# Patient Record
Sex: MALE | Race: WHITE | Employment: FULL TIME | URBAN - METROPOLITAN AREA
[De-identification: names, ages, dates, MRNs, and addresses within clinical notes are randomized per-mention and may not be internally consistent; named-entity substitution may affect disease eponyms.]

---

## 2017-01-03 ENCOUNTER — TELEPHONE (OUTPATIENT)
Dept: FAMILY MEDICINE CLINIC | Age: 22
End: 2017-01-03

## 2017-07-12 ENCOUNTER — OFFICE VISIT (OUTPATIENT)
Dept: SURGERY | Age: 22
End: 2017-07-12

## 2017-07-12 VITALS
HEIGHT: 72 IN | DIASTOLIC BLOOD PRESSURE: 98 MMHG | SYSTOLIC BLOOD PRESSURE: 145 MMHG | WEIGHT: 302 LBS | BODY MASS INDEX: 40.9 KG/M2 | HEART RATE: 83 BPM

## 2017-07-12 DIAGNOSIS — Z72.0 TOBACCO ABUSE: ICD-10-CM

## 2017-07-12 DIAGNOSIS — K60.2 ANAL FISSURE: Primary | ICD-10-CM

## 2017-07-12 PROCEDURE — 99213 OFFICE O/P EST LOW 20 MIN: CPT | Performed by: SURGERY

## 2017-07-12 ASSESSMENT — ENCOUNTER SYMPTOMS
RECTAL PAIN: 1
RESPIRATORY NEGATIVE: 1
ANAL BLEEDING: 0
CONSTIPATION: 1
BLOOD IN STOOL: 0

## 2017-10-31 ENCOUNTER — OFFICE VISIT (OUTPATIENT)
Dept: FAMILY MEDICINE CLINIC | Age: 22
End: 2017-10-31

## 2017-10-31 VITALS
BODY MASS INDEX: 40.66 KG/M2 | HEIGHT: 72 IN | TEMPERATURE: 99.1 F | HEART RATE: 80 BPM | WEIGHT: 300.2 LBS | SYSTOLIC BLOOD PRESSURE: 140 MMHG | DIASTOLIC BLOOD PRESSURE: 92 MMHG

## 2017-10-31 DIAGNOSIS — R51.9 ACUTE NONINTRACTABLE HEADACHE, UNSPECIFIED HEADACHE TYPE: Primary | ICD-10-CM

## 2017-10-31 DIAGNOSIS — H53.9 VISION CHANGES: ICD-10-CM

## 2017-10-31 PROCEDURE — G8417 CALC BMI ABV UP PARAM F/U: HCPCS | Performed by: FAMILY MEDICINE

## 2017-10-31 PROCEDURE — G8484 FLU IMMUNIZE NO ADMIN: HCPCS | Performed by: FAMILY MEDICINE

## 2017-10-31 PROCEDURE — 4004F PT TOBACCO SCREEN RCVD TLK: CPT | Performed by: FAMILY MEDICINE

## 2017-10-31 PROCEDURE — 99214 OFFICE O/P EST MOD 30 MIN: CPT | Performed by: FAMILY MEDICINE

## 2017-10-31 PROCEDURE — G8427 DOCREV CUR MEDS BY ELIG CLIN: HCPCS | Performed by: FAMILY MEDICINE

## 2017-10-31 RX ORDER — METHYLPREDNISOLONE 4 MG/1
TABLET ORAL
Qty: 1 KIT | Refills: 0 | Status: SHIPPED | OUTPATIENT
Start: 2017-10-31 | End: 2017-11-06

## 2017-10-31 RX ORDER — BUTALBITAL, ACETAMINOPHEN AND CAFFEINE 300; 40; 50 MG/1; MG/1; MG/1
1 CAPSULE ORAL EVERY 4 HOURS PRN
Qty: 20 CAPSULE | Refills: 0 | Status: SHIPPED | OUTPATIENT
Start: 2017-10-31 | End: 2018-12-10 | Stop reason: ALTCHOICE

## 2017-10-31 ASSESSMENT — ENCOUNTER SYMPTOMS
CHEST TIGHTNESS: 0
EYE REDNESS: 0
EYE DISCHARGE: 0
VOMITING: 0
SHORTNESS OF BREATH: 0
CONSTIPATION: 0
COUGH: 0
ABDOMINAL PAIN: 0
SORE THROAT: 0
PHOTOPHOBIA: 1
SINUS PAIN: 0
BLOOD IN STOOL: 0
EYE PAIN: 0
WHEEZING: 0
SINUS PRESSURE: 0
DIARRHEA: 0
RHINORRHEA: 0

## 2017-10-31 NOTE — PATIENT INSTRUCTIONS
Cigarette Smoking and Its Health Risks   GENERAL INFORMATION:   Smoking and your health: Cigarette smoking is the most preventable cause of illness and death in the [place]. A large number of Americans smoke cigarettes, and each year more than one million children and adults start smoking cigarettes. Many people die every year from illnesses caused by smoking. People who smoke die earlier than those who do not smoke. The risk of disease increases if you smoke a lot, inhale deeply, or have smoked many years. Why are cigarettes bad for you? Cigarettes are filled with poison that goes into the lungs when you inhale. Coughing, dizziness, and burning of the eyes, nose, and throat are early signs that smoking is harming you. Smoking increases your health risks if you have diabetes, high blood pressure, or high blood cholesterol. The long-term problems of smoking cigarettes are the following:   Cancer: Smoking increases your chances of getting cancer. Cigarette smoking may play a role in developing many kinds of cancer. Lung cancer is the most common kind of cancer caused by smoking. A smoker is at greater risk of getting cancer of the lips, mouth, throat, or voice box. Smokers also have a higher risk of getting esophagus, stomach, kidney, pancreas, cervix, bladder, and skin cancer. Heart and blood vessel disease: If you already have heart or blood vessel problems and smoke, you are at even greater risk of having continued or worse health problems. The nicotine in the tobacco causes an increase in your heart rate and blood pressure. The arteries (blood vessels) in your arms and legs tighten and narrow because of the nicotine in cigarette smoke. Cigarette smoke increases blood clotting, and may damage the lining of your heart's arteries and other blood vessels. Carbon monoxide is a harmful gas that gets into the blood and decreases oxygen going to the heart and the body. Cigarette smoke contains this gas. Women and smoking: You may have a higher risk of having a heart attack or stroke if you smoke and use birth control pills. This risk is more serious if you are 35 years or older. The risk of losing your unborn baby or having a stillborn baby is higher if you are pregnant and smoke. Babies born to smoking mothers often weigh less, and are at a higher risk of sudden infant death syndrome (SIDS). You may have a harder time getting pregnant if you are a smoker. Women who smoke may have a higher risk of osteoporosis (also known as \"brittle bones\"). Women who smoke also have a higher risk of incontinence, which is when you are unable to control when you urinate. Are there risks with smoking cigars or pipes? The risks are the same for people who smoke cigars or pipes as they are for cigarette smokers. There is a risk of getting cancer of the mouth, lip, larynx (voice box), or esophagus if you smoke a cigar or pipe. What are the risks of using snuff or chewing tobacco (\"smokeless tobacco\")? People who use snuff or chewing tobacco have an increased risk of getting mouth or throat cancer. The risk of heart disease, stroke, blood vessel disease and stomach problems is the same as it is for cigarette smokers. What is \"passive smoking\"? Tobacco smoke is dangerous to others. The effect that smoking has on nonsmokers is called \"passive smoking\". Nonsmokers who breathe tobacco smoke have the same health risks as smokers. Children who are around tobacco smoke may have more colds, ear infections, or other breathing problems. Why should I quit smoking? The benefits from quitting smoking happen right away. Your sense of taste and smell will improve. Your body, clothes, car, and home will not smell of tobacco smoke. Your chance of getting cancer will be reduced as compared to a person who does not quit. As a former smoker, you will live longer than people who continue to smoke.  Women who quit smoking before getting pregnant have before following any medical regimen to see if it is safe and effective for you.

## 2017-10-31 NOTE — PROGRESS NOTES
Family History   Problem Relation Age of Onset    Asthma Sister     Cancer Paternal Grandmother      colon     Social History   Substance Use Topics    Smoking status: Never Smoker    Smokeless tobacco: Current User     Types: Chew      Comment: a third of a can a day     Alcohol use No     Vitals:    10/31/17 1119 10/31/17 1156   BP: (!) 142/94 (!) 140/92   Pulse: 80    Temp: 99.1 °F (37.3 °C)    TempSrc: Oral    Weight: (!) 300 lb 3.2 oz (136.2 kg)    Height: 6' (1.829 m)      Body mass index is 40.71 kg/m². Wt Readings from Last 3 Encounters:   10/31/17 (!) 300 lb 3.2 oz (136.2 kg)   07/12/17 (!) 302 lb (137 kg)   03/03/17 280 lb (127 kg)     BP Readings from Last 3 Encounters:   10/31/17 (!) 140/92   07/12/17 (!) 145/98   03/03/17 140/90        Review of Systems   Constitutional: Negative for chills, fatigue, fever and unexpected weight change. HENT: Negative for congestion, ear discharge, ear pain, hearing loss, postnasal drip, rhinorrhea, sinus pain, sinus pressure, sore throat and tinnitus. Eyes: Positive for photophobia and visual disturbance. Negative for pain, discharge and redness. Respiratory: Negative for cough, chest tightness, shortness of breath and wheezing. Cardiovascular: Negative for chest pain, palpitations and leg swelling. Gastrointestinal: Negative for abdominal pain, blood in stool, constipation, diarrhea and vomiting. Genitourinary: Negative for difficulty urinating, dysuria and hematuria. Skin: Negative for rash. Neurological: Positive for headaches. Negative for dizziness, seizures, syncope, weakness, light-headedness and numbness. Psychiatric/Behavioral: Negative for dysphoric mood and sleep disturbance. The patient is not nervous/anxious. Objective:   Physical Exam   Constitutional: He is oriented to person, place, and time. He appears well-developed and well-nourished. No distress. HENT:   Head: Normocephalic and atraumatic.    Right Ear:

## 2018-12-10 ENCOUNTER — OFFICE VISIT (OUTPATIENT)
Dept: FAMILY MEDICINE CLINIC | Age: 23
End: 2018-12-10
Payer: COMMERCIAL

## 2018-12-10 VITALS
WEIGHT: 299 LBS | SYSTOLIC BLOOD PRESSURE: 128 MMHG | DIASTOLIC BLOOD PRESSURE: 84 MMHG | BODY MASS INDEX: 40.5 KG/M2 | HEIGHT: 72 IN | TEMPERATURE: 98.6 F

## 2018-12-10 DIAGNOSIS — R59.9 ENLARGED LYMPH NODE: Primary | ICD-10-CM

## 2018-12-10 DIAGNOSIS — R59.9 ENLARGED LYMPH NODE: ICD-10-CM

## 2018-12-10 LAB
BASOPHILS ABSOLUTE: 0.1 K/UL (ref 0–0.2)
BASOPHILS RELATIVE PERCENT: 0.7 %
EOSINOPHILS ABSOLUTE: 0.3 K/UL (ref 0–0.6)
EOSINOPHILS RELATIVE PERCENT: 2.8 %
HCT VFR BLD CALC: 49.7 % (ref 40.5–52.5)
HEMOGLOBIN: 16.9 G/DL (ref 13.5–17.5)
LYMPHOCYTES ABSOLUTE: 2.5 K/UL (ref 1–5.1)
LYMPHOCYTES RELATIVE PERCENT: 26.2 %
MCH RBC QN AUTO: 27.6 PG (ref 26–34)
MCHC RBC AUTO-ENTMCNC: 33.9 G/DL (ref 31–36)
MCV RBC AUTO: 81.4 FL (ref 80–100)
MONOCYTES ABSOLUTE: 1.1 K/UL (ref 0–1.3)
MONOCYTES RELATIVE PERCENT: 11.2 %
NEUTROPHILS ABSOLUTE: 5.7 K/UL (ref 1.7–7.7)
NEUTROPHILS RELATIVE PERCENT: 59.1 %
PDW BLD-RTO: 13 % (ref 12.4–15.4)
PLATELET # BLD: 338 K/UL (ref 135–450)
PMV BLD AUTO: 9.5 FL (ref 5–10.5)
RBC # BLD: 6.1 M/UL (ref 4.2–5.9)
WBC # BLD: 9.7 K/UL (ref 4–11)

## 2018-12-10 PROCEDURE — G8484 FLU IMMUNIZE NO ADMIN: HCPCS | Performed by: FAMILY MEDICINE

## 2018-12-10 PROCEDURE — G8427 DOCREV CUR MEDS BY ELIG CLIN: HCPCS | Performed by: FAMILY MEDICINE

## 2018-12-10 PROCEDURE — 1036F TOBACCO NON-USER: CPT | Performed by: FAMILY MEDICINE

## 2018-12-10 PROCEDURE — G8417 CALC BMI ABV UP PARAM F/U: HCPCS | Performed by: FAMILY MEDICINE

## 2018-12-10 PROCEDURE — 99213 OFFICE O/P EST LOW 20 MIN: CPT | Performed by: FAMILY MEDICINE

## 2018-12-10 ASSESSMENT — PATIENT HEALTH QUESTIONNAIRE - PHQ9
1. LITTLE INTEREST OR PLEASURE IN DOING THINGS: 0
SUM OF ALL RESPONSES TO PHQ QUESTIONS 1-9: 0
2. FEELING DOWN, DEPRESSED OR HOPELESS: 0
SUM OF ALL RESPONSES TO PHQ QUESTIONS 1-9: 0
SUM OF ALL RESPONSES TO PHQ9 QUESTIONS 1 & 2: 0

## 2019-01-21 ENCOUNTER — HOSPITAL ENCOUNTER (OUTPATIENT)
Dept: GENERAL RADIOLOGY | Age: 24
Discharge: HOME | End: 2019-01-21
Payer: COMMERCIAL

## 2019-01-21 ENCOUNTER — HOSPITAL ENCOUNTER (OUTPATIENT)
Age: 24
Discharge: HOME | End: 2019-01-21
Payer: COMMERCIAL

## 2019-01-21 ENCOUNTER — OFFICE VISIT (OUTPATIENT)
Dept: FAMILY MEDICINE CLINIC | Age: 24
End: 2019-01-21
Payer: COMMERCIAL

## 2019-01-21 VITALS
TEMPERATURE: 98.4 F | SYSTOLIC BLOOD PRESSURE: 134 MMHG | DIASTOLIC BLOOD PRESSURE: 88 MMHG | WEIGHT: 305.4 LBS | BODY MASS INDEX: 41.37 KG/M2 | HEIGHT: 72 IN

## 2019-01-21 DIAGNOSIS — M54.6 CHRONIC BILATERAL THORACIC BACK PAIN: ICD-10-CM

## 2019-01-21 DIAGNOSIS — R79.89 ABNORMAL CBC: ICD-10-CM

## 2019-01-21 DIAGNOSIS — G89.29 CHRONIC BILATERAL THORACIC BACK PAIN: Primary | ICD-10-CM

## 2019-01-21 DIAGNOSIS — G89.29 CHRONIC BILATERAL THORACIC BACK PAIN: ICD-10-CM

## 2019-01-21 DIAGNOSIS — M54.6 CHRONIC BILATERAL THORACIC BACK PAIN: Primary | ICD-10-CM

## 2019-01-21 LAB
BASOPHILS ABSOLUTE: 0.1 K/UL (ref 0–0.2)
BASOPHILS RELATIVE PERCENT: 0.7 %
EOSINOPHILS ABSOLUTE: 0.2 K/UL (ref 0–0.6)
EOSINOPHILS RELATIVE PERCENT: 2.3 %
HCT VFR BLD CALC: 48.9 % (ref 40.5–52.5)
HEMOGLOBIN: 16.6 G/DL (ref 13.5–17.5)
LYMPHOCYTES ABSOLUTE: 2.2 K/UL (ref 1–5.1)
LYMPHOCYTES RELATIVE PERCENT: 23.8 %
MCH RBC QN AUTO: 28 PG (ref 26–34)
MCHC RBC AUTO-ENTMCNC: 34 G/DL (ref 31–36)
MCV RBC AUTO: 82.3 FL (ref 80–100)
MONOCYTES ABSOLUTE: 1.1 K/UL (ref 0–1.3)
MONOCYTES RELATIVE PERCENT: 11.6 %
NEUTROPHILS ABSOLUTE: 5.7 K/UL (ref 1.7–7.7)
NEUTROPHILS RELATIVE PERCENT: 61.6 %
PDW BLD-RTO: 13.1 % (ref 12.4–15.4)
PLATELET # BLD: 333 K/UL (ref 135–450)
PMV BLD AUTO: 10.3 FL (ref 5–10.5)
RBC # BLD: 5.93 M/UL (ref 4.2–5.9)
WBC # BLD: 9.3 K/UL (ref 4–11)

## 2019-01-21 PROCEDURE — G8427 DOCREV CUR MEDS BY ELIG CLIN: HCPCS | Performed by: FAMILY MEDICINE

## 2019-01-21 PROCEDURE — 99213 OFFICE O/P EST LOW 20 MIN: CPT | Performed by: FAMILY MEDICINE

## 2019-01-21 PROCEDURE — G8417 CALC BMI ABV UP PARAM F/U: HCPCS | Performed by: FAMILY MEDICINE

## 2019-01-21 PROCEDURE — 72072 X-RAY EXAM THORAC SPINE 3VWS: CPT

## 2019-01-21 PROCEDURE — G8484 FLU IMMUNIZE NO ADMIN: HCPCS | Performed by: FAMILY MEDICINE

## 2019-01-21 PROCEDURE — 1036F TOBACCO NON-USER: CPT | Performed by: FAMILY MEDICINE

## 2019-01-21 PROCEDURE — 71046 X-RAY EXAM CHEST 2 VIEWS: CPT

## 2019-11-26 ENCOUNTER — HOSPITAL ENCOUNTER (OUTPATIENT)
Age: 24
Discharge: HOME | End: 2019-11-26
Payer: COMMERCIAL

## 2019-11-26 ENCOUNTER — OFFICE VISIT (OUTPATIENT)
Dept: FAMILY MEDICINE CLINIC | Age: 24
End: 2019-11-26
Payer: COMMERCIAL

## 2019-11-26 ENCOUNTER — HOSPITAL ENCOUNTER (OUTPATIENT)
Dept: GENERAL RADIOLOGY | Age: 24
Discharge: HOME | End: 2019-11-26
Payer: COMMERCIAL

## 2019-11-26 VITALS
WEIGHT: 301 LBS | OXYGEN SATURATION: 98 % | TEMPERATURE: 99.5 F | SYSTOLIC BLOOD PRESSURE: 138 MMHG | HEIGHT: 72 IN | DIASTOLIC BLOOD PRESSURE: 86 MMHG | HEART RATE: 76 BPM | BODY MASS INDEX: 40.77 KG/M2

## 2019-11-26 DIAGNOSIS — R07.9 CHEST PAIN, UNSPECIFIED TYPE: ICD-10-CM

## 2019-11-26 DIAGNOSIS — R42 DIZZY: ICD-10-CM

## 2019-11-26 DIAGNOSIS — R07.9 CHEST PAIN, UNSPECIFIED TYPE: Primary | ICD-10-CM

## 2019-11-26 DIAGNOSIS — R06.09 DOE (DYSPNEA ON EXERTION): ICD-10-CM

## 2019-11-26 PROCEDURE — G8484 FLU IMMUNIZE NO ADMIN: HCPCS | Performed by: FAMILY MEDICINE

## 2019-11-26 PROCEDURE — 99214 OFFICE O/P EST MOD 30 MIN: CPT | Performed by: FAMILY MEDICINE

## 2019-11-26 PROCEDURE — 71046 X-RAY EXAM CHEST 2 VIEWS: CPT

## 2019-11-26 PROCEDURE — G8427 DOCREV CUR MEDS BY ELIG CLIN: HCPCS | Performed by: FAMILY MEDICINE

## 2019-11-26 PROCEDURE — 93000 ELECTROCARDIOGRAM COMPLETE: CPT | Performed by: FAMILY MEDICINE

## 2019-11-26 PROCEDURE — 1036F TOBACCO NON-USER: CPT | Performed by: FAMILY MEDICINE

## 2019-11-26 PROCEDURE — G8417 CALC BMI ABV UP PARAM F/U: HCPCS | Performed by: FAMILY MEDICINE

## 2019-11-27 ENCOUNTER — TELEPHONE (OUTPATIENT)
Dept: FAMILY MEDICINE CLINIC | Age: 24
End: 2019-11-27

## 2019-11-27 LAB
A/G RATIO: 1.4 (ref 1.1–2.2)
ALBUMIN SERPL-MCNC: 4.7 G/DL (ref 3.4–5)
ALP BLD-CCNC: 89 U/L (ref 40–129)
ALT SERPL-CCNC: 30 U/L (ref 10–40)
ANION GAP SERPL CALCULATED.3IONS-SCNC: 16 MMOL/L (ref 3–16)
AST SERPL-CCNC: 26 U/L (ref 15–37)
BASOPHILS ABSOLUTE: 0.1 K/UL (ref 0–0.2)
BASOPHILS RELATIVE PERCENT: 0.8 %
BILIRUB SERPL-MCNC: 0.3 MG/DL (ref 0–1)
BUN BLDV-MCNC: 14 MG/DL (ref 7–20)
CALCIUM SERPL-MCNC: 9.5 MG/DL (ref 8.3–10.6)
CHLORIDE BLD-SCNC: 97 MMOL/L (ref 99–110)
CO2: 23 MMOL/L (ref 21–32)
CREAT SERPL-MCNC: 0.8 MG/DL (ref 0.9–1.3)
EOSINOPHILS ABSOLUTE: 0.3 K/UL (ref 0–0.6)
EOSINOPHILS RELATIVE PERCENT: 3.3 %
GFR AFRICAN AMERICAN: >60
GFR NON-AFRICAN AMERICAN: >60
GLOBULIN: 3.3 G/DL
GLUCOSE BLD-MCNC: 88 MG/DL (ref 70–99)
HCT VFR BLD CALC: 47.6 % (ref 40.5–52.5)
HEMOGLOBIN: 16.1 G/DL (ref 13.5–17.5)
LYMPHOCYTES ABSOLUTE: 2.3 K/UL (ref 1–5.1)
LYMPHOCYTES RELATIVE PERCENT: 27.1 %
MCH RBC QN AUTO: 28.5 PG (ref 26–34)
MCHC RBC AUTO-ENTMCNC: 33.8 G/DL (ref 31–36)
MCV RBC AUTO: 84.3 FL (ref 80–100)
MONOCYTES ABSOLUTE: 1.2 K/UL (ref 0–1.3)
MONOCYTES RELATIVE PERCENT: 14 %
NEUTROPHILS ABSOLUTE: 4.7 K/UL (ref 1.7–7.7)
NEUTROPHILS RELATIVE PERCENT: 54.8 %
PDW BLD-RTO: 13.2 % (ref 12.4–15.4)
PLATELET # BLD: 290 K/UL (ref 135–450)
PMV BLD AUTO: 10.3 FL (ref 5–10.5)
POTASSIUM SERPL-SCNC: 4.4 MMOL/L (ref 3.5–5.1)
RBC # BLD: 5.65 M/UL (ref 4.2–5.9)
SODIUM BLD-SCNC: 136 MMOL/L (ref 136–145)
TOTAL PROTEIN: 8 G/DL (ref 6.4–8.2)
TSH SERPL DL<=0.05 MIU/L-ACNC: 1.35 UIU/ML (ref 0.27–4.2)
WBC # BLD: 8.7 K/UL (ref 4–11)

## 2019-12-02 ENCOUNTER — HOSPITAL ENCOUNTER (EMERGENCY)
Age: 24
Discharge: HOME | End: 2019-12-02
Attending: EMERGENCY MEDICINE
Payer: COMMERCIAL

## 2019-12-02 ENCOUNTER — APPOINTMENT (OUTPATIENT)
Dept: GENERAL RADIOLOGY | Age: 24
End: 2019-12-02
Payer: COMMERCIAL

## 2019-12-02 ENCOUNTER — TELEPHONE (OUTPATIENT)
Dept: FAMILY MEDICINE CLINIC | Age: 24
End: 2019-12-02

## 2019-12-02 VITALS
OXYGEN SATURATION: 100 % | BODY MASS INDEX: 40.44 KG/M2 | WEIGHT: 305.12 LBS | DIASTOLIC BLOOD PRESSURE: 82 MMHG | HEIGHT: 73 IN | TEMPERATURE: 98.3 F | RESPIRATION RATE: 10 BRPM | SYSTOLIC BLOOD PRESSURE: 165 MMHG | HEART RATE: 91 BPM

## 2019-12-02 DIAGNOSIS — R07.9 CHEST PAIN, UNSPECIFIED TYPE: Primary | ICD-10-CM

## 2019-12-02 DIAGNOSIS — I10 HYPERTENSION, UNSPECIFIED TYPE: ICD-10-CM

## 2019-12-02 DIAGNOSIS — R42 DIZZINESS: ICD-10-CM

## 2019-12-02 LAB
A/G RATIO: 1.3 (ref 1.1–2.2)
ALBUMIN SERPL-MCNC: 4.7 G/DL (ref 3.4–5)
ALP BLD-CCNC: 94 U/L (ref 40–129)
ALT SERPL-CCNC: 36 U/L (ref 10–40)
ANION GAP SERPL CALCULATED.3IONS-SCNC: 12 MMOL/L (ref 3–16)
AST SERPL-CCNC: 22 U/L (ref 15–37)
BASOPHILS ABSOLUTE: 0.5 K/UL (ref 0–0.2)
BASOPHILS RELATIVE PERCENT: 4.8 %
BILIRUB SERPL-MCNC: 0.3 MG/DL (ref 0–1)
BUN BLDV-MCNC: 12 MG/DL (ref 7–20)
CALCIUM SERPL-MCNC: 9.8 MG/DL (ref 8.3–10.6)
CHLORIDE BLD-SCNC: 100 MMOL/L (ref 99–110)
CO2: 25 MMOL/L (ref 21–32)
CREAT SERPL-MCNC: 0.8 MG/DL (ref 0.9–1.3)
D DIMER: 0.36 UG/ML FEU
EKG ATRIAL RATE: 77 BPM
EKG DIAGNOSIS: NORMAL
EKG P AXIS: 25 DEGREES
EKG P-R INTERVAL: 170 MS
EKG Q-T INTERVAL: 350 MS
EKG QRS DURATION: 86 MS
EKG QTC CALCULATION (BAZETT): 396 MS
EKG R AXIS: 46 DEGREES
EKG T AXIS: 5 DEGREES
EKG VENTRICULAR RATE: 77 BPM
EOSINOPHILS ABSOLUTE: 0.2 K/UL (ref 0–0.6)
EOSINOPHILS RELATIVE PERCENT: 2.3 %
GFR AFRICAN AMERICAN: >60
GFR NON-AFRICAN AMERICAN: >60
GLOBULIN: 3.5 G/DL
GLUCOSE BLD-MCNC: 92 MG/DL (ref 70–99)
HCT VFR BLD CALC: 49.3 % (ref 40.5–52.5)
HEMOGLOBIN: 16.8 G/DL (ref 13.5–17.5)
LYMPHOCYTES ABSOLUTE: 1.9 K/UL (ref 1–5.1)
LYMPHOCYTES RELATIVE PERCENT: 19.3 %
MCH RBC QN AUTO: 28.2 PG (ref 26–34)
MCHC RBC AUTO-ENTMCNC: 34.1 G/DL (ref 31–36)
MCV RBC AUTO: 82.8 FL (ref 80–100)
MONOCYTES ABSOLUTE: 0.9 K/UL (ref 0–1.3)
MONOCYTES RELATIVE PERCENT: 8.8 %
NEUTROPHILS ABSOLUTE: 6.3 K/UL (ref 1.7–7.7)
NEUTROPHILS RELATIVE PERCENT: 64.8 %
PDW BLD-RTO: 12 % (ref 12.4–15.4)
PLATELET # BLD: 315 K/UL (ref 135–450)
PMV BLD AUTO: 9.2 FL (ref 5–10.5)
POTASSIUM REFLEX MAGNESIUM: 4.3 MMOL/L (ref 3.5–5.1)
PRO-BNP: 24 PG/ML (ref 0–124)
RBC # BLD: 5.96 M/UL (ref 4.2–5.9)
SODIUM BLD-SCNC: 137 MMOL/L (ref 136–145)
TOTAL PROTEIN: 8.2 G/DL (ref 6.4–8.2)
TROPONIN: <0.01 NG/ML
WBC # BLD: 9.8 K/UL (ref 4–11)

## 2019-12-02 PROCEDURE — 93005 ELECTROCARDIOGRAM TRACING: CPT | Performed by: EMERGENCY MEDICINE

## 2019-12-02 PROCEDURE — 85379 FIBRIN DEGRADATION QUANT: CPT

## 2019-12-02 PROCEDURE — 84484 ASSAY OF TROPONIN QUANT: CPT

## 2019-12-02 PROCEDURE — 80053 COMPREHEN METABOLIC PANEL: CPT

## 2019-12-02 PROCEDURE — 36415 COLL VENOUS BLD VENIPUNCTURE: CPT

## 2019-12-02 PROCEDURE — 85025 COMPLETE CBC W/AUTO DIFF WBC: CPT

## 2019-12-02 PROCEDURE — 99284 EMERGENCY DEPT VISIT MOD MDM: CPT

## 2019-12-02 PROCEDURE — 83880 ASSAY OF NATRIURETIC PEPTIDE: CPT

## 2019-12-02 PROCEDURE — 93010 ELECTROCARDIOGRAM REPORT: CPT | Performed by: INTERNAL MEDICINE

## 2019-12-02 PROCEDURE — 71046 X-RAY EXAM CHEST 2 VIEWS: CPT

## 2019-12-02 RX ORDER — ALUMINA, MAGNESIA, AND SIMETHICONE 2400; 2400; 240 MG/30ML; MG/30ML; MG/30ML
15 SUSPENSION ORAL EVERY 4 HOURS PRN
Qty: 1 BOTTLE | Refills: 0 | Status: SHIPPED | OUTPATIENT
Start: 2019-12-02 | End: 2019-12-06

## 2019-12-02 ASSESSMENT — PAIN SCALES - GENERAL
PAINLEVEL_OUTOF10: 0
PAINLEVEL_OUTOF10: 4
PAINLEVEL_OUTOF10: 1

## 2019-12-02 ASSESSMENT — HEART SCORE: ECG: 0

## 2019-12-02 ASSESSMENT — PAIN DESCRIPTION - LOCATION: LOCATION: CHEST

## 2019-12-02 ASSESSMENT — PAIN DESCRIPTION - PAIN TYPE: TYPE: ACUTE PAIN

## 2019-12-02 ASSESSMENT — PAIN DESCRIPTION - DESCRIPTORS: DESCRIPTORS: TIGHTNESS

## 2019-12-06 ENCOUNTER — OFFICE VISIT (OUTPATIENT)
Dept: FAMILY MEDICINE CLINIC | Age: 24
End: 2019-12-06
Payer: COMMERCIAL

## 2019-12-06 VITALS
SYSTOLIC BLOOD PRESSURE: 160 MMHG | HEART RATE: 80 BPM | BODY MASS INDEX: 40.29 KG/M2 | WEIGHT: 304 LBS | HEIGHT: 73 IN | DIASTOLIC BLOOD PRESSURE: 100 MMHG | TEMPERATURE: 98.7 F

## 2019-12-06 DIAGNOSIS — R06.83 SNORES: ICD-10-CM

## 2019-12-06 DIAGNOSIS — I10 HYPERTENSION, ESSENTIAL, BENIGN: Primary | ICD-10-CM

## 2019-12-06 PROCEDURE — G8427 DOCREV CUR MEDS BY ELIG CLIN: HCPCS | Performed by: FAMILY MEDICINE

## 2019-12-06 PROCEDURE — G8417 CALC BMI ABV UP PARAM F/U: HCPCS | Performed by: FAMILY MEDICINE

## 2019-12-06 PROCEDURE — G8484 FLU IMMUNIZE NO ADMIN: HCPCS | Performed by: FAMILY MEDICINE

## 2019-12-06 PROCEDURE — 4004F PT TOBACCO SCREEN RCVD TLK: CPT | Performed by: FAMILY MEDICINE

## 2019-12-06 PROCEDURE — 99213 OFFICE O/P EST LOW 20 MIN: CPT | Performed by: FAMILY MEDICINE

## 2019-12-06 RX ORDER — NIFEDIPINE 30 MG/1
30 TABLET, EXTENDED RELEASE ORAL DAILY
Qty: 30 TABLET | Refills: 3 | Status: SHIPPED | OUTPATIENT
Start: 2019-12-06 | End: 2019-12-30

## 2019-12-06 ASSESSMENT — ENCOUNTER SYMPTOMS
ABDOMINAL PAIN: 0
DIARRHEA: 0
VOMITING: 0

## 2019-12-10 ENCOUNTER — OFFICE VISIT (OUTPATIENT)
Dept: FAMILY MEDICINE CLINIC | Age: 24
End: 2019-12-10
Payer: COMMERCIAL

## 2019-12-10 VITALS
HEIGHT: 73 IN | HEART RATE: 76 BPM | WEIGHT: 307 LBS | SYSTOLIC BLOOD PRESSURE: 142 MMHG | TEMPERATURE: 98.7 F | DIASTOLIC BLOOD PRESSURE: 92 MMHG | BODY MASS INDEX: 40.69 KG/M2

## 2019-12-10 DIAGNOSIS — I10 HYPERTENSION, ESSENTIAL, BENIGN: Primary | ICD-10-CM

## 2019-12-10 DIAGNOSIS — R07.9 CHEST PAIN, UNSPECIFIED TYPE: ICD-10-CM

## 2019-12-10 PROCEDURE — G8484 FLU IMMUNIZE NO ADMIN: HCPCS | Performed by: FAMILY MEDICINE

## 2019-12-10 PROCEDURE — G8427 DOCREV CUR MEDS BY ELIG CLIN: HCPCS | Performed by: FAMILY MEDICINE

## 2019-12-10 PROCEDURE — 4004F PT TOBACCO SCREEN RCVD TLK: CPT | Performed by: FAMILY MEDICINE

## 2019-12-10 PROCEDURE — 99213 OFFICE O/P EST LOW 20 MIN: CPT | Performed by: FAMILY MEDICINE

## 2019-12-10 PROCEDURE — G8417 CALC BMI ABV UP PARAM F/U: HCPCS | Performed by: FAMILY MEDICINE

## 2019-12-30 ENCOUNTER — OFFICE VISIT (OUTPATIENT)
Dept: FAMILY MEDICINE CLINIC | Age: 24
End: 2019-12-30
Payer: COMMERCIAL

## 2019-12-30 VITALS
WEIGHT: 311 LBS | TEMPERATURE: 98.3 F | HEIGHT: 73 IN | DIASTOLIC BLOOD PRESSURE: 98 MMHG | SYSTOLIC BLOOD PRESSURE: 140 MMHG | BODY MASS INDEX: 41.22 KG/M2 | HEART RATE: 84 BPM

## 2019-12-30 DIAGNOSIS — I10 HYPERTENSION, ESSENTIAL, BENIGN: Primary | ICD-10-CM

## 2019-12-30 PROCEDURE — G8484 FLU IMMUNIZE NO ADMIN: HCPCS | Performed by: FAMILY MEDICINE

## 2019-12-30 PROCEDURE — G8427 DOCREV CUR MEDS BY ELIG CLIN: HCPCS | Performed by: FAMILY MEDICINE

## 2019-12-30 PROCEDURE — 99213 OFFICE O/P EST LOW 20 MIN: CPT | Performed by: FAMILY MEDICINE

## 2019-12-30 PROCEDURE — G8417 CALC BMI ABV UP PARAM F/U: HCPCS | Performed by: FAMILY MEDICINE

## 2019-12-30 PROCEDURE — 4004F PT TOBACCO SCREEN RCVD TLK: CPT | Performed by: FAMILY MEDICINE

## 2019-12-30 RX ORDER — NIFEDIPINE 60 MG/1
60 TABLET, FILM COATED, EXTENDED RELEASE ORAL DAILY
Qty: 30 TABLET | Refills: 3 | Status: SHIPPED | OUTPATIENT
Start: 2019-12-30 | End: 2020-01-16 | Stop reason: ALTCHOICE

## 2020-01-15 ENCOUNTER — TELEPHONE (OUTPATIENT)
Dept: FAMILY MEDICINE CLINIC | Age: 25
End: 2020-01-15

## 2020-01-16 RX ORDER — VALSARTAN AND HYDROCHLOROTHIAZIDE 80; 12.5 MG/1; MG/1
1 TABLET, FILM COATED ORAL DAILY
Qty: 30 TABLET | Refills: 3 | Status: SHIPPED | OUTPATIENT
Start: 2020-01-16 | End: 2020-03-09 | Stop reason: SDUPTHER

## 2020-01-16 NOTE — TELEPHONE ENCOUNTER
[phone] - Ladarius advised and verbalized understanding. He states he will call to schedule CT appt. Central Scheduling [phone] was given to patient. He states the headache is more in the frontal part of head. He states he did not take his medication today and he does not have a headache. He will return call once CT appt has been made and to schedule appt here.

## 2020-01-16 NOTE — TELEPHONE ENCOUNTER
Get ct of the head for new onset daily headaches and htn  Have him follow up a day or two after  See if can be done in the next 1-2 days  Any particular part of the head? Or whole head? Any thing worsen it?   Worse go to the ER

## 2020-03-03 RX ORDER — VALSARTAN AND HYDROCHLOROTHIAZIDE 80; 12.5 MG/1; MG/1
1 TABLET, FILM COATED ORAL DAILY
Qty: 90 TABLET | OUTPATIENT
Start: 2020-03-03

## 2020-03-10 RX ORDER — VALSARTAN AND HYDROCHLOROTHIAZIDE 80; 12.5 MG/1; MG/1
1 TABLET, FILM COATED ORAL DAILY
Qty: 30 TABLET | Refills: 3 | Status: SHIPPED | OUTPATIENT
Start: 2020-03-10 | End: 2020-05-08

## 2020-04-01 ENCOUNTER — NURSE TRIAGE (OUTPATIENT)
Dept: CARDIOLOGY CLINIC | Age: 25
End: 2020-04-01

## 2020-05-08 ENCOUNTER — TELEPHONE (OUTPATIENT)
Dept: FAMILY MEDICINE CLINIC | Age: 25
End: 2020-05-08

## 2020-05-08 RX ORDER — VALSARTAN AND HYDROCHLOROTHIAZIDE 80; 12.5 MG/1; MG/1
1 TABLET, FILM COATED ORAL DAILY
Qty: 30 TABLET | Refills: 0 | Status: SHIPPED | OUTPATIENT
Start: 2020-05-08 | End: 2020-06-10 | Stop reason: DRUGHIGH

## 2020-06-02 RX ORDER — VALSARTAN AND HYDROCHLOROTHIAZIDE 80; 12.5 MG/1; MG/1
TABLET, FILM COATED ORAL
Qty: 30 TABLET | Refills: 0 | OUTPATIENT
Start: 2020-06-02

## 2020-06-10 ENCOUNTER — VIRTUAL VISIT (OUTPATIENT)
Dept: FAMILY MEDICINE CLINIC | Age: 25
End: 2020-06-10
Payer: COMMERCIAL

## 2020-06-10 PROCEDURE — 99213 OFFICE O/P EST LOW 20 MIN: CPT | Performed by: FAMILY MEDICINE

## 2020-06-10 RX ORDER — VALSARTAN AND HYDROCHLOROTHIAZIDE 160; 12.5 MG/1; MG/1
1 TABLET, FILM COATED ORAL DAILY
Qty: 30 TABLET | Refills: 3 | Status: SHIPPED | OUTPATIENT
Start: 2020-06-10 | End: 2020-07-27

## 2020-06-10 ASSESSMENT — ENCOUNTER SYMPTOMS
SHORTNESS OF BREATH: 0
BLOOD IN STOOL: 0
COUGH: 0
NAUSEA: 0
CONSTIPATION: 0
ABDOMINAL DISTENTION: 0
VOMITING: 0
CHEST TIGHTNESS: 0
DIARRHEA: 0
ABDOMINAL PAIN: 0

## 2020-06-10 ASSESSMENT — PATIENT HEALTH QUESTIONNAIRE - PHQ9
SUM OF ALL RESPONSES TO PHQ9 QUESTIONS 1 & 2: 0
1. LITTLE INTEREST OR PLEASURE IN DOING THINGS: 0
SUM OF ALL RESPONSES TO PHQ QUESTIONS 1-9: 0
2. FEELING DOWN, DEPRESSED OR HOPELESS: 0
SUM OF ALL RESPONSES TO PHQ QUESTIONS 1-9: 0

## 2020-06-23 ENCOUNTER — TELEPHONE (OUTPATIENT)
Dept: FAMILY MEDICINE CLINIC | Age: 25
End: 2020-06-23

## 2020-06-25 ENCOUNTER — OFFICE VISIT (OUTPATIENT)
Dept: FAMILY MEDICINE CLINIC | Age: 25
End: 2020-06-25
Payer: COMMERCIAL

## 2020-06-25 VITALS
DIASTOLIC BLOOD PRESSURE: 84 MMHG | BODY MASS INDEX: 41.22 KG/M2 | HEART RATE: 80 BPM | SYSTOLIC BLOOD PRESSURE: 128 MMHG | TEMPERATURE: 97.3 F | WEIGHT: 311 LBS | HEIGHT: 73 IN

## 2020-06-25 PROCEDURE — 99213 OFFICE O/P EST LOW 20 MIN: CPT | Performed by: FAMILY MEDICINE

## 2020-06-25 RX ORDER — PANTOPRAZOLE SODIUM 40 MG/1
40 TABLET, DELAYED RELEASE ORAL
Qty: 30 TABLET | Refills: 2 | Status: SHIPPED | OUTPATIENT
Start: 2020-06-25 | End: 2020-07-17

## 2020-07-06 ENCOUNTER — TELEPHONE (OUTPATIENT)
Dept: FAMILY MEDICINE CLINIC | Age: 25
End: 2020-07-06

## 2020-07-06 NOTE — TELEPHONE ENCOUNTER
[phone] - Ladarius advised and verbalized understanding. Dr. Michi Kimball [phone] was given to patient.

## 2020-07-17 RX ORDER — PANTOPRAZOLE SODIUM 40 MG/1
TABLET, DELAYED RELEASE ORAL
Qty: 30 TABLET | Refills: 2 | Status: SHIPPED | OUTPATIENT
Start: 2020-07-17 | End: 2020-10-16

## 2020-10-16 RX ORDER — PANTOPRAZOLE SODIUM 40 MG/1
TABLET, DELAYED RELEASE ORAL
Qty: 90 TABLET | Refills: 0 | Status: SHIPPED | OUTPATIENT
Start: 2020-10-16 | End: 2021-02-01

## 2020-11-11 ENCOUNTER — TELEPHONE (OUTPATIENT)
Dept: FAMILY MEDICINE CLINIC | Age: 25
End: 2020-11-11

## 2020-11-11 NOTE — TELEPHONE ENCOUNTER
Patient was exposed to covid by coworker and he is required to get tested. He wants to go to [facility] but needs an order. He will find out the fax number before you call him back.       Please call, [phone]

## 2021-01-01 RX ORDER — VALSARTAN AND HYDROCHLOROTHIAZIDE 160; 12.5 MG/1; MG/1
TABLET, FILM COATED ORAL
Qty: 30 TABLET | Refills: 0 | Status: SHIPPED | OUTPATIENT
Start: 2021-01-01 | End: 2021-01-29

## 2021-01-18 ENCOUNTER — OFFICE VISIT (OUTPATIENT)
Dept: FAMILY MEDICINE CLINIC | Age: 26
End: 2021-01-18
Payer: COMMERCIAL

## 2021-01-18 VITALS
SYSTOLIC BLOOD PRESSURE: 134 MMHG | WEIGHT: 315 LBS | DIASTOLIC BLOOD PRESSURE: 88 MMHG | HEART RATE: 88 BPM | HEIGHT: 73 IN | TEMPERATURE: 97.3 F | BODY MASS INDEX: 41.75 KG/M2

## 2021-01-18 DIAGNOSIS — I10 HYPERTENSION, ESSENTIAL, BENIGN: Primary | ICD-10-CM

## 2021-01-18 DIAGNOSIS — I10 HYPERTENSION, ESSENTIAL, BENIGN: ICD-10-CM

## 2021-01-18 DIAGNOSIS — R06.83 SNORES: ICD-10-CM

## 2021-01-18 PROCEDURE — 99213 OFFICE O/P EST LOW 20 MIN: CPT | Performed by: FAMILY MEDICINE

## 2021-01-18 NOTE — PATIENT INSTRUCTIONS
Continue the current medicines  Do see the sleep doctor  Call for any problems  See me in about 6 months

## 2021-01-18 NOTE — PROGRESS NOTES
Zahra Hamm (:  1995) is a 31 y.o. male,, here for evaluation of the following chief complaint(s):  Follow-up (hypertension)      ASSESSMENT/PLAN:   Diagnosis Orders   1. Hypertension, essential, benign  Basic Metabolic Panel   2. Snores           Continue the current medicines  Do see the sleep doctor  Call for any problems  See me in about 6 months   No follow-ups on file. SUBJECTIVE/OBJECTIVE:  Patient presents with: Follow-up: hypertension    He is well and no c/o  He does note he is putting on weight  Sleep is poor and he has not done the sleep study   No fever no cold  Family is well  meds same    No cp no sob  No abdomen pain   Urine and bm is doing fine   No unusual ha  No heartburn now no dysphagia no gerd  Still snores and will wake up tired        YOB: 1995    Date of Visit:  2021    No Known Allergies    Current Outpatient Medications:    valsartan-hydrochlorothiazide (DIOVAN-HCT) 160-12.5 MG per tablet, TAKE 1 TABLET BY MOUTH EVERY DAY, Disp: 30 tablet, Rfl: 0    pantoprazole (PROTONIX) 40 MG tablet, TAKE 1 TABLET BY MOUTH EVERY DAY BEFORE BREAKFAST, Disp: 90 tablet, Rfl: 0    melatonin 5 MG TABS tablet, Take 5 mg by mouth daily, Disp: , Rfl:     No current facility-administered medications for this visit.       -------------------------------                 21                          1555           -------------------------------   BP:            134/88           Site:      Left Upper Arm       Position:       Sitting          Cuff Size:     Large Adult        Pulse:           88             Temp:     97.3 °F (36.3 °C)     TempSrc:      Temporal          Weight: (!) 330 lb (149.7 kg)   Height:    6' 1\" (1.854 m)     -------------------------------  Body mass index is 43.54 kg/m².      Wt Readings from Last 3 Encounters:  21 : (!) 330 lb (149.7 kg)  20 : (!) 311 lb (141.1 kg) 12/30/19 : (!) 311 lb (141.1 kg)    BP Readings from Last 3 Encounters:  01/18/21 : 134/88  06/25/20 : 128/84  12/30/19 : (!) 140/98            Review of Systems    Physical Exam  Constitutional:       General: He is not in acute distress. Appearance: Normal appearance. He is well-developed. He is not ill-appearing or diaphoretic. Eyes:      General: No scleral icterus. Neck:      Musculoskeletal: Neck supple. Thyroid: No thyroid mass or thyromegaly. Cardiovascular:      Rate and Rhythm: Normal rate and regular rhythm. Heart sounds: Normal heart sounds. No murmur. No friction rub. No gallop. Pulmonary:      Effort: Pulmonary effort is normal. No tachypnea, accessory muscle usage or respiratory distress. Breath sounds: Normal breath sounds. No decreased breath sounds, wheezing, rhonchi or rales. Lymphadenopathy:      Cervical: No cervical adenopathy. Upper Body:      Right upper body: No supraclavicular adenopathy. Left upper body: No supraclavicular adenopathy. Skin:     General: Skin is warm and dry. Coloration: Skin is not pale. Neurological:      General: No focal deficit present. Mental Status: He is alert. An electronic signature was used to authenticate this note.     --Amadeo José MD

## 2021-01-19 LAB
ANION GAP SERPL CALCULATED.3IONS-SCNC: 13 MMOL/L (ref 3–16)
BUN BLDV-MCNC: 10 MG/DL (ref 7–20)
CALCIUM SERPL-MCNC: 9.8 MG/DL (ref 8.3–10.6)
CHLORIDE BLD-SCNC: 98 MMOL/L (ref 99–110)
CO2: 25 MMOL/L (ref 21–32)
CREAT SERPL-MCNC: 0.8 MG/DL (ref 0.9–1.3)
GFR AFRICAN AMERICAN: >60
GFR NON-AFRICAN AMERICAN: >60
GLUCOSE BLD-MCNC: 127 MG/DL (ref 70–99)
POTASSIUM SERPL-SCNC: 4 MMOL/L (ref 3.5–5.1)
SODIUM BLD-SCNC: 136 MMOL/L (ref 136–145)

## 2021-01-21 ENCOUNTER — TELEPHONE (OUTPATIENT)
Dept: FAMILY MEDICINE CLINIC | Age: 26
End: 2021-01-21

## 2021-01-26 ENCOUNTER — OFFICE VISIT (OUTPATIENT)
Dept: SLEEP MEDICINE | Age: 26
End: 2021-01-26
Payer: COMMERCIAL

## 2021-01-26 VITALS
TEMPERATURE: 96.9 F | BODY MASS INDEX: 41.75 KG/M2 | DIASTOLIC BLOOD PRESSURE: 78 MMHG | OXYGEN SATURATION: 96 % | SYSTOLIC BLOOD PRESSURE: 136 MMHG | WEIGHT: 315 LBS | HEART RATE: 116 BPM | HEIGHT: 73 IN | RESPIRATION RATE: 12 BRPM

## 2021-01-26 DIAGNOSIS — I10 ESSENTIAL HYPERTENSION: ICD-10-CM

## 2021-01-26 DIAGNOSIS — G47.33 OBSTRUCTIVE SLEEP APNEA: Primary | ICD-10-CM

## 2021-01-26 DIAGNOSIS — E66.01 CLASS 3 SEVERE OBESITY DUE TO EXCESS CALORIES WITH SERIOUS COMORBIDITY AND BODY MASS INDEX (BMI) OF 40.0 TO 44.9 IN ADULT (HCC): ICD-10-CM

## 2021-01-26 PROBLEM — K21.9 GASTROESOPHAGEAL REFLUX DISEASE: Status: ACTIVE | Noted: 2021-01-26

## 2021-01-26 PROCEDURE — 99204 OFFICE O/P NEW MOD 45 MIN: CPT | Performed by: PSYCHIATRY & NEUROLOGY

## 2021-01-26 ASSESSMENT — ENCOUNTER SYMPTOMS
CHOKING: 1
ALLERGIC/IMMUNOLOGIC NEGATIVE: 1
STRIDOR: 1
APNEA: 1
COUGH: 1
GASTROINTESTINAL NEGATIVE: 1
EYES NEGATIVE: 1
BACK PAIN: 1

## 2021-01-26 ASSESSMENT — SLEEP AND FATIGUE QUESTIONNAIRES
NECK CIRCUMFERENCE (INCHES): 19
HOW LIKELY ARE YOU TO NOD OFF OR FALL ASLEEP WHEN YOU ARE A PASSENGER IN A CAR FOR AN HOUR WITHOUT A BREAK: 2
HOW LIKELY ARE YOU TO NOD OFF OR FALL ASLEEP WHILE LYING DOWN TO REST IN THE AFTERNOON WHEN CIRCUMSTANCES PERMIT: 1

## 2021-01-26 NOTE — PATIENT INSTRUCTIONS
· Sleep on your side. It may stop mild apnea. If you tend to roll onto your back, sew a pocket in the back of your pajama top. Put a tennis ball into the pocket, and stitch the pocket shut. This will help keep you from sleeping on your back. · Avoid alcohol and medicines such as sleeping pills and sedatives before bed. · Do not smoke. Smoking can make sleep apnea worse. If you need help quitting, talk to your doctor about stop-smoking programs and medicines. These can increase your chances of quitting for good. · Prop up the head of your bed 4 to 6 inches by putting bricks under the legs of the bed. · Treat breathing problems, such as a stuffy nose, caused by a cold or allergies. · Try a continuous positive airway pressure (CPAP) breathing machine if your doctor recommends it. The machine keeps your airway open when you sleep. · If CPAP does not work for you, ask your doctor if you can try other breathing machines. A bilevel positive airway pressure machine uses one type of air pressure for breathing in and another type for breathing out. Another device raises or lowers air pressure as needed while you breathe. · Talk to your doctor if:  ? Your nose feels dry or bleeds when you use one of these machines. You may need to increase moisture in the air. A humidifier may help. ? Your nose is runny or stuffy from using a breathing machine. Decongestants or a corticosteroid nasal spray may help. ? You are sleepy during the day and it gets in the way of the normal things you do. Do not drive when you are drowsy. When should you call for help? Watch closely for changes in your health, and be sure to contact your doctor if:    · You still have sleep apnea even though you have made lifestyle changes.     · You are thinking of trying a device such as CPAP.     · You are having problems using a CPAP or similar machine. Where can you learn more? Go to [email or web address] and sign in to your [employer] account. Enter T296 in the [place] box to learn more about \"Sleep Apnea: Care Instructions. \"     If you do not have an account, please click on the \"Sign Up Now\" link. Current as of: February 24, 2020               Content Version: 12.6  © 6808-7785 [employer], [employer] [facility]. Care instructions adapted under license by [facility] ([facility]). If you have questions about a medical condition or this instruction, always ask your healthcare professional. [place] [address] any warranty or liability for your use of this information.

## 2021-01-26 NOTE — PROGRESS NOTES
MD KEEGAN Dhillon Board Certified in Sleep Medicine  Certified [facility] Sleep Medicine  Board Certified in Neurology [address]  [address]  [address] [place] [address] [place] [facility] [address], [address]           [address]  [address] [facility] [address] [phone]    Subjective:     Patient ID: Mariia Pope is a 31 y.o. male. Chief Complaint   Patient presents with    Snoring       HPI:        Mariia Pope is a 31 y.o. male referred by Dr. Gloria Miller for a sleep evaluation. He complains of snoring, snorting, choking, periods of not breathing, tossing and turning, decreased concentration, excessive daytime sleepiness, feels sleepy during the day but he denies knees buckling with laughing, completely or partially paralyzed while falling asleep or waking up, noisy environment, uncomfortable room temperature, uncomfortable bedding. Symptoms began several years ago, gradually worsening since that time. The patient's bed-partner confirmed the snoring and stopped breathing at night  SLEEP SCHEDULE: Goes to bed around 9:30 PM in the weekdays and 11 PM in the weekends. It usually takes the patient 45 minutes to fall asleep. The patient gets up 1-2 per night to go to the bathroom. The Patient finally gets up at 6:30 AM during the weekdays and 8 AM in the weekends. patient wakes up with dry mouth and sometimes morning headache. . the headache usually dull headache lasts 30-60 minutes. The patient has restless sleep with frequent arousals in addition to the Patient has significant daytime sleepiness. The Patient scored Total score: 8 on [place] Sleepiness Scale ( more than 10 is indicative of daytime sleepiness)and 53 in fatigue scale ( more than 36 is indicative of daytime fatigue). The patient takes NO NAPS. Previous evaluation and treatment has included- none. The Patient has been obese for many years and tried, has gained 35 in the last 5 years,  unsuccessfully to lose weight through diet, exercise. DOT/CDL - N/A  RASHAD/'alejandrina - N/A  The patient HTN is stable. Previous Report(s) Reviewed: historical medical records       Social History     Socioeconomic History    Marital status:      Spouse name: Not on file    Number of children: Not on file    Years of education: Not on file    Highest education level: Not on file   Occupational History    Not on file   Social Needs    Financial resource strain: Not on file    Food insecurity     Worry: Not on file     Inability: Not on file    Transportation needs     Medical: Not on file     Non-medical: Not on file   Tobacco Use    Smoking status: Never Smoker    Smokeless tobacco: Current User     Types: Chew    Tobacco comment: 1/26/21 Trying to quit, a third of a can a day    Substance and Sexual Activity    Alcohol use: Yes     Comment: every other day    Drug use: No    Sexual activity: Yes     Partners: Female   Lifestyle    Physical activity     Days per week: Not on file     Minutes per session: Not on file    Stress: Not on file   Relationships    Social connections     Talks on phone: Not on file     Gets together: Not on file     Attends [employer] service: Not on file     Active member of club or organization: Not on file     Attends meetings of clubs or organizations: Not on file     Relationship status: Not on file    Intimate partner violence     Fear of current or ex partner: Not on file     Emotionally abused: Not on file     Physically abused: Not on file     Forced sexual activity: Not on file   Other Topics Concern    Not on file   Social History Narrative    Not on file       Prior to Admission medications    Medication Sig Start Date End Date Taking?  Authorizing Provider valsartan-hydrochlorothiazide (DIOVAN-HCT) 160-12.5 MG per tablet TAKE 1 TABLET BY MOUTH EVERY DAY 1/1/21  Yes Benja Thomas MD   pantoprazole (PROTONIX) 40 MG tablet TAKE 1 TABLET BY MOUTH EVERY DAY BEFORE BREAKFAST 10/16/20  Yes Tim Panchal DO   melatonin 5 MG TABS tablet Take 5 mg by mouth daily   Yes Historical Provider, MD       Allergies as of 01/26/2021    (No Known Allergies)       Patient Active Problem List   Diagnosis    Bipolar affective disorder ([facility]    Tobacco abuse    Anal fissure    Hypertension, essential, benign    Gastroesophageal reflux disease    Hypertension       Past Medical History:   Diagnosis Date    Bipolar affective disorder ([facility]        Past Surgical History:   Procedure Laterality Date    LIPOMA RESECTION      SHOULDER SURGERY      left       Family History   Problem Relation Age of Onset    Asthma Sister     Cancer Paternal Grandmother         colon    Diabetes Mother     High Blood Pressure Mother     High Blood Pressure Father     Cancer Maternal Grandfather        Review of Systems   Constitutional: Positive for fatigue and unexpected weight change. HENT: Negative for congestion. Eyes: Negative. Respiratory: Positive for apnea, cough, choking and stridor. Cardiovascular: Negative. Gastrointestinal: Negative. Endocrine: Negative. Genitourinary: Positive for frequency. Musculoskeletal: Positive for arthralgias, back pain and myalgias. Skin: Negative. Allergic/Immunologic: Negative. Neurological: Positive for headaches (AM). Hematological: Negative. Psychiatric/Behavioral: Positive for agitation, decreased concentration and dysphoric mood. The patient is nervous/anxious.         Objective:     Vitals:  Weight BMI Neck circumference    Wt Readings from Last 3 Encounters:   01/26/21 (!) 324 lb 3.2 oz (147.1 kg)   01/18/21 (!) 330 lb (149.7 kg)   06/25/20 (!) 311 lb (141.1 kg) Body mass index is 42.77 kg/m². Neck circumference: 19     BP HR SaO2   BP Readings from Last 3 Encounters:   01/26/21 136/78   01/18/21 134/88   06/25/20 128/84    Pulse Readings from Last 3 Encounters:   01/26/21 116   01/18/21 88   06/25/20 80    SpO2 Readings from Last 3 Encounters:   01/26/21 96%   12/02/19 100%   11/26/19 98%        The mandibular molar Class :   [x]1 []2 []3      Mallampati I Airway Classification:   [x]1 []2 []3 []4        Physical Exam  Vitals signs and nursing note reviewed. Constitutional:       Appearance: Normal appearance. HENT:      Head: Atraumatic. Nose: Nose normal.      Mouth/Throat:      Comments: Mallampati class 1, no retrognathia or hypognathia , normal airflow in bilateral nostrils, no septum deviation , crowded oropharynx with low soft palate, high arched hard palate,no tonsils enlargement. Eyes:      Extraocular Movements: Extraocular movements intact. Neck:      Musculoskeletal: Normal range of motion and neck supple. Cardiovascular:      Rate and Rhythm: Normal rate and regular rhythm. Heart sounds: Normal heart sounds. Pulmonary:      Effort: Pulmonary effort is normal.      Breath sounds: Normal breath sounds. Musculoskeletal: Normal range of motion. Skin:     General: Skin is warm. Neurological:      General: No focal deficit present. Psychiatric:         Mood and Affect: Mood normal.         Assessment:    Obstructive sleep apnea especially with snoring, snorting,  observed apnea, daytime sleepiness, large neck circumference, Mallampati class of 1 and obesity. Diagnosis Orders   1. Obstructive sleep apnea  Home Sleep Study   2. Essential hypertension  Home Sleep Study   3.  Class 3 severe obesity due to excess calories with serious comorbidity and body mass index (BMI) of 40.0 to 44.9 in adult [facility])  Home Sleep Study    Ambulatory referral to [address] [place] [address] Str: Patient was counseled about the pathophysiology of obstructive sleep apnea syndrome and the methods for evaluating its presence and severity. Patient was counseled to avoid driving and other potentially hazardous circumstances if the patient is experiencing excessive sleepiness. Treatment considerations include the use of nasal CPAP, oral dental appliance or a surgical intervention, which should be based on otolarygologic findings, In the meantime, the patient should be cautioned to avoid the use of alcohol or other depressant medications because of potential for increasing the duration and severity of apnea and cautioned regarding driving or operating and dangerous equipment if the patient is experiencing daytime sleepiness. .    Most likely treating the PURVI will have position impact on HTN control. We discussed the proportionality between weight and AHI. With 10% weight change, the AHI has a 27% proportionate change. With 20% weight change, the AHI has a 45-50% proportionate change. The Patient accepts referral to bariatrics for further consideration of weight loss methods. Orders Placed This Encounter   Procedures    Ambulatory referral to [facility] Sleep Study       Return in about 3 months (around 4/26/2021) for Reveiwing CPAP usage and compliance report and tro.     Sarah Beth Serrato MD  Medical Director [address] [place] [address]

## 2021-02-08 ENCOUNTER — HOSPITAL ENCOUNTER (OUTPATIENT)
Dept: SLEEP CENTER | Age: 26
Discharge: HOME | End: 2021-02-08
Payer: COMMERCIAL

## 2021-02-08 DIAGNOSIS — G47.33 OBSTRUCTIVE SLEEP APNEA: ICD-10-CM

## 2021-02-08 DIAGNOSIS — E66.01 CLASS 3 SEVERE OBESITY DUE TO EXCESS CALORIES WITH SERIOUS COMORBIDITY AND BODY MASS INDEX (BMI) OF 40.0 TO 44.9 IN ADULT (HCC): ICD-10-CM

## 2021-02-08 DIAGNOSIS — I10 ESSENTIAL HYPERTENSION: ICD-10-CM

## 2021-02-08 PROCEDURE — 95806 SLEEP STUDY UNATT&RESP EFFT: CPT | Performed by: PSYCHIATRY & NEUROLOGY

## 2021-02-08 PROCEDURE — 95806 SLEEP STUDY UNATT&RESP EFFT: CPT

## 2021-02-10 DIAGNOSIS — G47.33 OSA (OBSTRUCTIVE SLEEP APNEA): Primary | ICD-10-CM

## 2021-02-12 ENCOUNTER — TELEPHONE (OUTPATIENT)
Dept: PULMONOLOGY | Age: 26
End: 2021-02-12

## 2021-03-04 ENCOUNTER — HOSPITAL ENCOUNTER (OUTPATIENT)
Dept: SLEEP CENTER | Age: 26
Discharge: HOME | End: 2021-03-04
Payer: COMMERCIAL

## 2021-03-04 DIAGNOSIS — G47.33 OSA (OBSTRUCTIVE SLEEP APNEA): ICD-10-CM

## 2021-03-04 PROCEDURE — 95811 POLYSOM 6/>YRS CPAP 4/> PARM: CPT | Performed by: PSYCHIATRY & NEUROLOGY

## 2021-03-04 PROCEDURE — 95811 POLYSOM 6/>YRS CPAP 4/> PARM: CPT

## 2021-03-08 ENCOUNTER — TELEPHONE (OUTPATIENT)
Dept: PULMONOLOGY | Age: 26
End: 2021-03-08

## 2021-03-08 NOTE — PROGRESS NOTES
Sparkle Rodriguez         : 1995  [] [address]     [] [address]      [] Nae     [][facility]    [] Alyson Locker  [] [facility]   [] Other:  Diagnosis: [x] PURVI (G47.33) [] CSA (G47.31) [] Apnea (G47.30)   Length of Need: [] 12 Months [x] 99 Months [] Other:    Machine (EVELYN!): [x] Respironics Dream Station      Auto [x] ResMed AirSense     Auto [] Other:     [x]  CPAP () [] Bilevel ()   Mode: [x] Auto [] Spontaneous    Mode: [] Auto [] Spontaneous           Between 10 and 14 cm                 Comfort Settings:   - Ramp Pressure: 5 cmH2O                                        - Ramp time: 15 min                                     -  Flex/EPR - 3 full time                                    - For ResMed Bilevel (TiMax-4 sec   TiMin- 0.2 sec)     Humidifier: [x] Heated ()        [x] Water chamber replacement ()/ 1 per 6 months        Mask:  Please always start with the mask the patient used during the titraion   [x] Nasal () /1 per 3 months [x] Full Face () /1 per 3 months   [x] Patient choice -Size and fit mask [x] Patient Choice - Size and fit mask   [] Dispense:  [] Dispense:    [x] Headgear () / 1 per 3 months [x] Headgear () / 1 per 3 months   [x] Replacement Nasal Cushion ()/2 per month [x] Interface Replacement ()/1 per month   [x] Replacement Nasal Pillows ()/2 per month         Tubing: [x] Heated ()/1 per 3 months    [] Standard ()/1 per 3 months [] Other:           Filters: [x] Non-disposable ()/1 per 6 months     [x] Ultra-Fine, Disposable ()/2 per month        Miscellaneous: [x] Chin Strap ()/ 1 per 6 months [] O2 bleed-in:       LPM   [] Oximetry on CPAP/Bilevel []  Other:          Start Order Date: 21    MEDICAL JUSTIFICATION:  I, the undersigned, certify that the above prescribed supplies are medically necessary for this patients wellbeing.   In my opinion, the supplies are both reasonable and necessary in reference to accepted standards of medicalpractice in treatment of this patients condition.     Verónica Hobbs MD      NPI: [id number]       Order Signed Date: 03/08/21    Electronically signed by Verónica Hobbs MD on 3/8/2021 at 9:26 AM

## 2021-03-08 NOTE — TELEPHONE ENCOUNTER
Phone call to patient regarding his titration study results:  History of severe sleep apnea adequately controled on CPAP pressures of  10 and 14 cm   DME choice [facility].   Order faxed today

## 2021-04-15 ENCOUNTER — TELEPHONE (OUTPATIENT)
Dept: FAMILY MEDICINE CLINIC | Age: 26
End: 2021-04-15

## 2021-04-29 RX ORDER — VALSARTAN AND HYDROCHLOROTHIAZIDE 160; 12.5 MG/1; MG/1
TABLET, FILM COATED ORAL
Qty: 90 TABLET | Refills: 1 | Status: SHIPPED | OUTPATIENT
Start: 2021-04-29 | End: 2022-01-26

## 2021-07-13 ENCOUNTER — HOSPITAL ENCOUNTER (EMERGENCY)
Age: 26
Discharge: HOME | End: 2021-07-13
Attending: EMERGENCY MEDICINE
Payer: COMMERCIAL

## 2021-07-13 ENCOUNTER — APPOINTMENT (OUTPATIENT)
Dept: GENERAL RADIOLOGY | Age: 26
End: 2021-07-13
Payer: COMMERCIAL

## 2021-07-13 VITALS
SYSTOLIC BLOOD PRESSURE: 159 MMHG | BODY MASS INDEX: 42 KG/M2 | WEIGHT: 315 LBS | HEART RATE: 98 BPM | RESPIRATION RATE: 18 BRPM | OXYGEN SATURATION: 98 % | TEMPERATURE: 98.6 F | DIASTOLIC BLOOD PRESSURE: 112 MMHG

## 2021-07-13 DIAGNOSIS — M25.572 LEFT LATERAL ANKLE PAIN: Primary | ICD-10-CM

## 2021-07-13 DIAGNOSIS — Z23 TETANUS-DIPHTHERIA VACCINATION ADMINISTERED AT CURRENT VISIT: ICD-10-CM

## 2021-07-13 DIAGNOSIS — M25.472 LEFT ANKLE SWELLING: ICD-10-CM

## 2021-07-13 DIAGNOSIS — S93.402A SPRAIN OF LEFT ANKLE, UNSPECIFIED LIGAMENT, INITIAL ENCOUNTER: ICD-10-CM

## 2021-07-13 DIAGNOSIS — I10 ELEVATED BLOOD PRESSURE READING IN OFFICE WITH DIAGNOSIS OF HYPERTENSION: ICD-10-CM

## 2021-07-13 PROCEDURE — 6370000000 HC RX 637 (ALT 250 FOR IP): Performed by: EMERGENCY MEDICINE

## 2021-07-13 PROCEDURE — 6360000002 HC RX W HCPCS: Performed by: EMERGENCY MEDICINE

## 2021-07-13 PROCEDURE — 73630 X-RAY EXAM OF FOOT: CPT

## 2021-07-13 PROCEDURE — 90715 TDAP VACCINE 7 YRS/> IM: CPT | Performed by: EMERGENCY MEDICINE

## 2021-07-13 PROCEDURE — 73610 X-RAY EXAM OF ANKLE: CPT

## 2021-07-13 PROCEDURE — 90471 IMMUNIZATION ADMIN: CPT | Performed by: EMERGENCY MEDICINE

## 2021-07-13 PROCEDURE — 99285 EMERGENCY DEPT VISIT HI MDM: CPT

## 2021-07-13 RX ORDER — ACETAMINOPHEN 325 MG/1
650 TABLET ORAL ONCE
Status: COMPLETED | OUTPATIENT
Start: 2021-07-13 | End: 2021-07-13

## 2021-07-13 RX ORDER — IBUPROFEN 400 MG/1
400 TABLET ORAL ONCE
Status: COMPLETED | OUTPATIENT
Start: 2021-07-13 | End: 2021-07-13

## 2021-07-13 RX ADMIN — IBUPROFEN 400 MG: 400 TABLET ORAL at 09:19

## 2021-07-13 RX ADMIN — TETANUS TOXOID, REDUCED DIPHTHERIA TOXOID AND ACELLULAR PERTUSSIS VACCINE, ADSORBED 0.5 ML: 5; 2.5; 8; 8; 2.5 SUSPENSION INTRAMUSCULAR at 10:06

## 2021-07-13 RX ADMIN — ACETAMINOPHEN 650 MG: 325 TABLET ORAL at 09:19

## 2021-07-13 ASSESSMENT — PAIN DESCRIPTION - ONSET: ONSET: SUDDEN

## 2021-07-13 ASSESSMENT — PAIN DESCRIPTION - PAIN TYPE: TYPE: ACUTE PAIN

## 2021-07-13 ASSESSMENT — PAIN SCALES - GENERAL
PAINLEVEL_OUTOF10: 6
PAINLEVEL_OUTOF10: 7
PAINLEVEL_OUTOF10: 7
PAINLEVEL_OUTOF10: 6

## 2021-07-13 ASSESSMENT — PAIN DESCRIPTION - LOCATION: LOCATION: ANKLE

## 2021-07-13 ASSESSMENT — ENCOUNTER SYMPTOMS
RESPIRATORY NEGATIVE: 1
EYES NEGATIVE: 1
GASTROINTESTINAL NEGATIVE: 1

## 2021-07-13 ASSESSMENT — PAIN DESCRIPTION - FREQUENCY: FREQUENCY: CONTINUOUS

## 2021-07-13 ASSESSMENT — PAIN DESCRIPTION - ORIENTATION: ORIENTATION: LEFT

## 2021-07-13 ASSESSMENT — PAIN DESCRIPTION - DESCRIPTORS: DESCRIPTORS: CRUSHING;SHARP

## 2021-07-13 NOTE — ED PROVIDER NOTES
[address]      Pt Name: Heidi Card  MRN: [id number]  [place] 1995  Date of evaluation: 7/13/2021  Provider: Ni Gaona MD    CHIEF COMPLAINT     I hurt my ankle  HISTORY OF PRESENT ILLNESS  (Location/Symptom, Timing/Onset,Context/Setting, Quality, Duration, Modifying Factors, Severity). Note limiting factors. Chief Complaint   Patient presents with    Ankle Injury     left. Heidi Card is a 31 y.o. male who presents to the emergency department secondary to concern for left ankle pain which occurred just prior to coming in. He reports he was working and did not see a hole which he stepped in. He states he felt a pop in his ankle. He immediately had pain, does not radiate. He took some ibuprofen this morning around 7 AM prior to the injury -has not taken any medication since the injury. He states he does not have any numbness, endorses pain worse with movement or trying to put weight on ankle, has not been able to walk on it since the injury occurred. Reports he has previously injured this ankle at least 2 or 3 times, has broken it, has not required surgery in the past.  Currently rates pain level 7 out of 10. Denies injury to other area. Past medical history noted below, significant for bipolar, GERD, and hypertension. Denies smoking cigarettes, does chew. Aside from what is stated above denies any other symptoms or modifying factors. Nursing Notes reviewed. REVIEW OF SYSTEMS  (2-9 systems for level 4, 10 or more for level 5)   Review of Systems   Constitutional: Negative. Eyes: Negative. Respiratory: Negative. Gastrointestinal: Negative. Musculoskeletal: Positive for gait problem.        PAST MEDICAL HISTORY     Past Medical History:   Diagnosis Date    Bipolar affective disorder ([facility] [address]     GERD (gastroesophageal reflux disease)     Hypertension        SURGICALHISTORY       Past Surgical History: Procedure Laterality Date    LIPOMA RESECTION      SHOULDER SURGERY      left     CURRENT MEDICATIONS       Previous Medications    MELATONIN 5 MG TABS TABLET    Take 5 mg by mouth daily    PANTOPRAZOLE (PROTONIX) 40 MG TABLET    TAKE 1 TABLET BY MOUTH EVERY MORNING BEFORE BREAKFAST    VALSARTAN-HYDROCHLOROTHIAZIDE (DIOVAN-HCT) 160-12.5 MG PER TABLET    TAKE 1 TABLET BY MOUTH EVERY DAY      ALLERGIES     Patient has no known allergies. FAMILY HISTORY       Family History   Problem Relation Age of Onset    Asthma Sister     Cancer Paternal Grandmother         colon    Diabetes Mother     High Blood Pressure Mother     High Blood Pressure Father     Cancer Maternal Grandfather      SOCIAL HISTORY       Social History     Socioeconomic History    Marital status:      Spouse name: None    Number of children: None    Years of education: None    Highest education level: None   Occupational History    None   Tobacco Use    Smoking status: Never Smoker    Smokeless tobacco: Current User     Types: Chew    Tobacco comment: 1/26/21 Trying to quit, a third of a can a day    Vaping Use    Vaping Use: Never used   Substance and Sexual Activity    Alcohol use: Yes     Comment: every other day    Drug use: No    Sexual activity: Yes     Partners: Female   Other Topics Concern    None   Social History Narrative    None     Social Determinants of Health     Financial Resource Strain:     Difficulty of Paying Living Expenses:    Food Insecurity:     Worried About Running Out of Food in the Last Year:     Ran Out of Food in the Last Year:    Transportation Needs:     Lack of Transportation (Medical):      Lack of Transportation (Non-Medical):    Physical Activity:     Days of Exercise per Week:     Minutes of Exercise per Session:    Stress:     Feeling of Stress :    Social Connections:     Frequency of Communication with Friends and Family:     Frequency of Social Gatherings with Friends and Family:     Attends Congregational Services:     Active Member of Clubs or Organizations:     Attends Club or Organization Meetings:     Marital Status:    Intimate Partner Violence:     Fear of Current or Ex-Partner:     Emotionally Abused:     Physically Abused:     Sexually Abused:      SCREENINGS         PHYSICAL EXAM  (up to 7 for level 4, 8 or more for level 5)   INITIAL VITALS: BP: (!) 197/122, Temp: 98.6 °F (37 °C), Pulse: 114, Resp: 20, SpO2: 99 %   Physical Exam  Vitals reviewed. Constitutional:       Appearance: He is not ill-appearing or toxic-appearing. HENT:      Head: Normocephalic and atraumatic. Right Ear: External ear normal.      Left Ear: External ear normal.   Eyes:      General:         Right eye: No discharge. Left eye: No discharge. Conjunctiva/sclera: Conjunctivae normal.   Neck:      Trachea: No tracheal deviation. Cardiovascular:      Rate and Rhythm: Tachycardia present. Pulmonary:      Effort: Pulmonary effort is normal. No respiratory distress. Musculoskeletal:         General: Swelling, tenderness and signs of injury present. Cervical back: Normal range of motion. Right ankle: Normal.      Right Achilles Tendon: No tenderness. Left ankle: Swelling present. No lacerations. Tenderness present over the lateral malleolus (Significant) and base of 5th metatarsal (Mild). No posterior TF ligament or proximal fibula tenderness. Normal pulse. Left Achilles Tendon: No tenderness. Left foot: Decreased range of motion. Feet:    Skin:     General: Skin is dry. Capillary Refill: Capillary refill takes less than 2 seconds. Neurological:      General: No focal deficit present. Mental Status: He is alert and oriented to person, place, and time.    Psychiatric:         Mood and Affect: Mood normal.         Behavior: Behavior normal.       DIAGNOSTIC RESULTS     RADIOLOGY:   Interpretation per Radiologist below, if available at the time of this note:  XR ANKLE LEFT (MIN 3 VIEWS)   Preliminary Result   Sequela of remote fracture of the lateral malleolus. No definite acute fracture identified. Large amount soft tissue swelling overlies the ankle extending into the foot. There is a joint effusion at the ankle. No obvious acute osseous abnormality of the foot. XR FOOT LEFT (MIN 3 VIEWS)   Preliminary Result   Sequela of remote fracture of the lateral malleolus. No definite acute fracture identified. Large amount soft tissue swelling overlies the ankle extending into the foot. There is a joint effusion at the ankle. No obvious acute osseous abnormality of the foot. LABS:  Labs Reviewed - No data to display    EMERGENCY DEPARTMENT COURSE and DIFFERENTIAL DIAGNOSIS/MDM:   Patient was given the following medications:  Orders Placed This Encounter   Medications    ibuprofen (ADVIL;MOTRIN) tablet 400 mg    acetaminophen (TYLENOL) tablet 650 mg    Tetanus-Diphth-Acell Pertussis (BOOSTRIX) injection 0.5 mL     CONSULTS:  None    INITIAL VITALS: BP: (!) 197/122, Temp: 98.6 °F (37 °C), Pulse: 114, Resp: 20, SpO2: 99 %   RECENT VITALS:  BP: (!) 159/112,Temp: 98.6 °F (37 °C), Pulse: 98, Resp: 18, SpO2: 98 %     Miki Killian is a 31 y.o. male who presents to the emergency department secondary to concern for left ankle pain status post injury which occurred while at work. On arrival he is awake, alert, oriented. Vitals notable for blood pressure 197/122, tachycardia 114, otherwise hemodynamically stable and within normal limits. On exam has significant swelling with ttp of the lateral malleoli on the left. Has had prior injury/breaks which have not required surgery to the area. He is neurovascularly intact distally. Strength DF/PF mildly limited by pain with all muscle groups firing appropriately. Right thumb with a superficial scratch, unknown tetanus status.      Ordered ice pack, ibuprofen 400 mg, Tylenol, and x-ray imaging. Review of medical record shows tdap last had 2010, ordered to update today. X-ray imaging revealed no signs of acute fracture. Ordered walking boot and crutches. On reassessment discussed results of imaging and potential for occult fracture. Repeat vitals improved. Discussed symptomatic treatment and follow up with ortho/[facility]. Went over instructions on boot and crutches. He expressed understanding prior to discharge. His work required UDS and he waited in ED for the ODACS to have this done. FINAL IMPRESSION      1. Left lateral ankle pain    2. Left ankle swelling    3. Sprain of left ankle, unspecified ligament, initial encounter    4.  Tetanus-diphtheria vaccination administered at current visit        DISPOSITION/PLAN   DISPOSITION        PATIENT REFERRED TO:  Jennifer Lubin MD  [address]  [phone]    Schedule an appointment as soon as possible for a visit   For follow up appointment, As needed    [facility]  [address] [facility] [address] [phone]    Schedule an appointment as soon as possible for a visit   For follow up appointment with workers comp    [facility]  [address] [facility]  [address] [place] [address]  [phone]  Schedule an appointment as soon as possible for a visit   For follow up appointment, As needed      DISCHARGE MEDICATIONS:  New Prescriptions    No medications on file            (Please note that portions of this note were completed with a voice recognition program. Efforts were made to edit the dictations but occasionally words are mis-transcribed.)    Adeola Peralta MD (electronically signed)  Attending Emergency Physician        Adeola Peralta MD  07/13/21 [id number]

## 2021-07-13 NOTE — ED NOTES
Pt discharged from ED to home. Pt verbalizes understanding to discharge instructions, teach back successful. Pt denies questions at this time. No acute distress noted. Resp even and unlabored. A/ox4. Pt instructed to follow-up as noted - return to ED if symptoms worsen. Pt verbalizes understanding. Discharged per ED MD with discharge instructions. Pt refuses ambulatory assistance to lobby and walks with steady gait with crutches.         Alphonse Franco RN  07/13/21 [id number]

## 2021-07-15 ENCOUNTER — OFFICE VISIT (OUTPATIENT)
Dept: ORTHOPEDIC SURGERY | Age: 26
End: 2021-07-15
Payer: COMMERCIAL

## 2021-07-15 VITALS
DIASTOLIC BLOOD PRESSURE: 95 MMHG | HEART RATE: 120 BPM | HEIGHT: 72 IN | SYSTOLIC BLOOD PRESSURE: 149 MMHG | WEIGHT: 310 LBS | BODY MASS INDEX: 41.99 KG/M2

## 2021-07-15 DIAGNOSIS — S93.402A MODERATE LEFT ANKLE SPRAIN, INITIAL ENCOUNTER: Primary | ICD-10-CM

## 2021-07-15 DIAGNOSIS — S93.492A SYNDESMOTIC ANKLE SPRAIN, LEFT, INITIAL ENCOUNTER: ICD-10-CM

## 2021-07-15 PROCEDURE — 99243 OFF/OP CNSLTJ NEW/EST LOW 30: CPT | Performed by: ORTHOPAEDIC SURGERY

## 2021-07-15 RX ORDER — IBUPROFEN 800 MG/1
800 TABLET ORAL 2 TIMES DAILY WITH MEALS
Qty: 28 TABLET | Refills: 0 | Status: SHIPPED | OUTPATIENT
Start: 2021-07-15

## 2021-07-15 RX ORDER — HYDROCODONE BITARTRATE AND ACETAMINOPHEN 5; 325 MG/1; MG/1
1 TABLET ORAL EVERY 6 HOURS PRN
Qty: 28 TABLET | Refills: 0 | Status: SHIPPED | OUTPATIENT
Start: 2021-07-15 | End: 2021-07-22

## 2021-07-15 NOTE — PROGRESS NOTES
Miki DVDPlay  <[id number]>  July 15, 2021    Chief Complaint   Patient presents with    Ankle Injury     Left. [address] [facility]  DOI 7/13/21 Stepped in a hole at work       History: The patient is a 59-year-old gentleman who is here for evaluation of his left ankle. The patient works as a . This is a Worker's Compensation case. The patient is employed with [employer] He was reportedly walking off a pathway and he stepped in a pothole and severely inverted his left ankle. He immediately noted left ankle pain and swelling. The patient does report a remote history of fracture of his left ankle and he recovered uneventfully. He has been taking Tylenol. The injury occurred on 7/13/2021. He denies any numbness or tingling. This is a consult from Ramakrishna Goldsmith MD for left ankle pain and swelling. The patient's  past medical history, medications, allergies,  family history, social history, and have been reviewed, and dated and are recorded in the chart. Pertinent items are noted in HPI. Review of systems reviewed from Pertinent History Form dated on 7/15/2021 and available in the patient's chart under the Media tab. Vitals:  BP (!) 149/95   Pulse 120   Ht 6' (1.829 m)   Wt (!) 310 lb (140.6 kg)   BMI 42.04 kg/m²     Physical: Physical Examination:   Physical Examination:   The patient presents today in no acute distress, awake, alert, and oriented. He is well dressed, nourished and  groomed. Patient with normal affect. The patient ambulates with an antalgic gait. Examination of both ankles showing a decreased range of motion of the left ankle compare to the other side. There is severe swelling that can be seen, as well as ecchymosis. He has severe tenderness over the anterior talofibular ligament and calcaneofibular ligament. The patient is non tender over the posterior talofibular ligament. The patient has severe tenderness over the syndesmosis.  He has intact sensation to light touch in the tibial, peroneal, sural, and saphenous nerve distributions. Pedal pulses are present 2+. The foot shows brisk capillary refill. The patient is able to wiggle all toes. The patient is non tender to palpation of the mid foot, hind foot, or forefoot. The skin reveals no evidence of blistering or open areas. Imaging:  Xrays: 3 views of the left ankle obtained in the emergency room were extensively reviewed. There is evidence of a rounded off ossicle just below the fibula consistent with old avulsion. Otherwise there is no evidence of fracture or dislocation. Impression:   Left ankle Grade 2 sprain. #2 left ankle syndesmotic sprain      Plan:   The treatment plan was discussed in detail with the patient. The left ankle will be protected in a tall boot. Shaaron Money He may weight bear as tolerated. He was encouraged to ice and elevate as much as possible. The patient will follow up with me in 2 weeks and we will reassess him then. We will then consider an outpatient physical therapy program.  The patient was given a one-time prescription for Norco.  He was also given a prescription for ibuprofen. He may begin light range of motion to the ankle in 2 weeks. Hopefully we can get the patient back to work shortly after next visit. We will gradually discontinue the boot after next visit.

## 2021-07-15 NOTE — LETTER
[facility]  [facility] [address] [address]  Phone: [phone]  Fax: [phone]    Charis Lazcano MD        July 15, 2021     Patient: Mitchel Badillo   YOB: 1995   Date of Visit: 7/15/2021       To Whom It May Concern: It is my medical opinion that Fernanda Carr should remain out of work until 7/29/21. If you have any questions or concerns, please don't hesitate to call.     Sincerely,          Charis Lazcano MD

## 2021-07-29 ENCOUNTER — OFFICE VISIT (OUTPATIENT)
Dept: ORTHOPEDIC SURGERY | Age: 26
End: 2021-07-29
Payer: COMMERCIAL

## 2021-07-29 VITALS — BODY MASS INDEX: 41.99 KG/M2 | RESPIRATION RATE: 16 BRPM | WEIGHT: 310 LBS | HEIGHT: 72 IN

## 2021-07-29 DIAGNOSIS — S93.492A SYNDESMOTIC ANKLE SPRAIN, LEFT, INITIAL ENCOUNTER: Primary | ICD-10-CM

## 2021-07-29 PROCEDURE — 99213 OFFICE O/P EST LOW 20 MIN: CPT | Performed by: ORTHOPAEDIC SURGERY

## 2021-07-29 NOTE — LETTER
[facility]  [place] [address] [address] [address]  Phone: [phone]  Fax: [phone]    Bernadine Holt MD        July 29, 2021     Patient: Miki Killian   YOB: 1995   Date of Visit: 7/29/2021       To Whom It May Concern: It is my medical opinion that Owen Weldon may return to light duty 7/30/21 with the following restrictions: minimal walking, for 3 weeks. If you have any questions or concerns, please don't hesitate to call.     Sincerely,          Bernadine Holt MD

## 2021-07-29 NOTE — PROGRESS NOTES
foot shows brisk capillary refill. The patient is able to wiggle all toes. The patient is non tender to palpation of the mid foot, hind foot, or forefoot. The skin reveals no evidence of blistering or open areas. Imaging:  Xrays: 3 views of the left ankle obtained in the emergency room were extensively reviewed. There is evidence of a rounded off ossicle just below the fibula consistent with old avulsion. Otherwise there is no evidence of fracture or dislocation. Impression:   Left ankle Grade 2 sprain. #2 left ankle syndesmotic sprain      Plan: At this time, we will continue our current treatment. We will get him into therapy to work on range of motion and strengthening. He may weight-bear as tolerated without the boot. We will keep him at light duty restrictions for the next 3 weeks. He will follow-up with me in 3 weeks and we will reassess him then. If he continues to have pain, we will consider an MRI.

## 2021-08-02 RX ORDER — PANTOPRAZOLE SODIUM 40 MG/1
TABLET, DELAYED RELEASE ORAL
Qty: 90 TABLET | Refills: 1 | Status: SHIPPED | OUTPATIENT
Start: 2021-08-02 | End: 2022-01-26

## 2021-08-05 ENCOUNTER — HOSPITAL ENCOUNTER (OUTPATIENT)
Dept: PHYSICAL THERAPY | Age: 26
Setting detail: THERAPIES SERIES
Discharge: HOME | End: 2021-08-05
Payer: COMMERCIAL

## 2021-08-05 NOTE — FLOWSHEET NOTE
[facility], [facility] [address] [address]  Phone: [phone]   Fax:     [phone]     Physical Therapy  Cancellation/No-show Note  Patient Name:  Mavis Escobar  :  1995   Date:  2021  Cancelled visits to date: 1  No-shows to date: 0    Patient status for today's appointment patient:  [x]  Cancelled- pt was approved by [email or web address] [facility] but not by [facility].            Electronically signed by:  Rosa Kevin PT

## 2021-08-06 ENCOUNTER — HOSPITAL ENCOUNTER (OUTPATIENT)
Dept: PHYSICAL THERAPY | Age: 26
Setting detail: THERAPIES SERIES
Discharge: HOME | End: 2021-08-06
Payer: COMMERCIAL

## 2021-08-06 PROCEDURE — 97110 THERAPEUTIC EXERCISES: CPT

## 2021-08-06 PROCEDURE — 97530 THERAPEUTIC ACTIVITIES: CPT

## 2021-08-06 PROCEDURE — 97161 PT EVAL LOW COMPLEX 20 MIN: CPT

## 2021-08-06 NOTE — FLOWSHEET NOTE
[facility] - Outpatient Rehabilitation & Therapy  [address]. [place]  Phone: [phone]   Fax:     [phone]      Physical Therapy Treatment Note/ Progress Report:     Date:  2021    Patient Name:  Andry Etienne    :  1995  MRN: [id number]    Pertinent Medical History:     Medical/Treatment Diagnosis Information:  Diagnosis: Syndesmotic ankle sprain, left, initial encounter (P56.994U)  Treatment Diagnosis: Gait and mobility dysfunction s/p left ankle sprain    Insurance/Certification information:  PT Insurance Information: [facility]- approved from 21-21 for 12 PT visits total, thru [facility], Reference number [id number]; was not approved by [facility]  Physician Information:  Referring Practitioner: Dr. Caleb Khan of care signed (Y/N): routed 21     Date of Patient follow up with Physician:      Progress Report: []  Yes  [x]  No     Date Range for reporting period:  Beginnin2021  Ending:      Progress report due (10 Rx/or 30 days whichever is less):     Recertification due (POC duration/ or 90 days whichever is less):     Visit # POC/Insurance Allowable Auth Needed   12 [facility] [x]Yes   []No     Latex Allergy:  [x]NO      []YES  Preferred Language for Healthcare:   [x]English       []Other:    Functional Scale:      Date assessed: at eval  Test: LEFS  Score:    Pain level: 0-4 /10     History of Injury:Patient stated complaint: 2021 stepped in a hole while at work and experienced a popping sensation and sudden pain with swelling.  Works as a   And is on light duty, activities include  caring for children, house/ yard work     SUBJECTIVE:  See eval    OBJECTIVE:   Observation:    Test measurements:      RESTRICTIONS/PRECAUTIONS: none    Exercises/Interventions:     Therapeutic Ex (35652)   Min: Reps/Resistance Notes/CUES   Nu step      Incline      Leg Press LLE Mat ex     Seated baps small rom  Avoid pain                                       Manual Intervention (17168)  Min:     Knee mobs/PROM     Tib/Fem Mobs     Patella Mobs     Ankle mobs               NMR re-education (09300)  Min:  CUES NEEDED                                 Therapeutic Activity (37737)  Min:     Pt education regarding joint protection  8/6/21 Discussed avoiding painful rom and activity to protect healing ligaments  ankle/ foot. Advised pt to obtain air cast ankle brace to be worn on beach vacation with a gym shoe while walking on uneven surfaces (sand) , use with casual walking with common gym shoe , issued photo copy of ankle air cast     He was educated on importance of keeping ankle stable for proper healing of injured ligaments     Advised pt to ice daily with elevation 10-15 minutes following ex. and or as needed Pt verbalized understanding, al questions were answered         Modalities  Min:     IFC with      CP after exercises 1o min     MH after exercises            Other Therapeutic Activities: Pt was educated on PT POC, Diagnosis, Prognosis, pathomechanics as well as frequency and duration of scheduling future physical therapy appointments. Time was also taken on this day to answer all patient questions and participation in PT. Reviewed appointment policy in detail with patient and patient verbalized understanding. Home Exercise Program: Patient was instructed in the following for HEP:     . Patient verbalized/demonstrated understanding and was issued written handout.       Therapeutic Exercise and NMR EXR  [] (27637) Provided verbal/tactile cueing for activities related to strengthening, flexibility, endurance, ROM for improvements in LE, proximal hip, and core control with self care, mobility, lifting, ambulation.  [] (01396) Provided verbal/tactile cueing for activities related to improving balance, coordination, kinesthetic sense, posture, motor skill, proprioception  to assist with LE, proximal hip, and core control in self care, mobility, lifting, ambulation and eccentric single leg control. NMR and Therapeutic Activities:    [] (03439 or 24274) Provided verbal/tactile cueing for activities related to improving balance, coordination, kinesthetic sense, posture, motor skill, proprioception and motor activation to allow for proper function of core, proximal hip and LE with self care and ADLs and functional mobility.   [] (74998) Gait Re-education- Provided training and instruction to the patient for proper LE, core and proximal hip recruitment and positioning and eccentric body weight control with ambulation re-education including up and down stairs     Home Exercise Program:    [x] (01118) Reviewed/Progressed HEP activities related to strengthening, flexibility, endurance, ROM of core, proximal hip and LE for functional self-care, mobility, lifting and ambulation/stair navigation   [] (15001)Reviewed/Progressed HEP activities related to improving balance, coordination, kinesthetic sense, posture, motor skill, proprioception of core, proximal hip and LE for self care, mobility, lifting, and ambulation/stair navigation      Manual Treatments:  PROM / STM / Oscillations-Mobs:  G-I, II, III, IV (PA's, Inf., Post.)  [] (22198) Provided manual therapy to mobilize LE, proximal hip and/or LS spine soft tissue/joints for the purpose of modulating pain, promoting relaxation,  increasing ROM, reducing/eliminating soft tissue swelling/inflammation/restriction, improving soft tissue extensibility and allowing for proper ROM for normal function with self care, mobility, lifting and ambulation.      If [facility] Please Indicate Time In/Out  CPT Code Time in Time out   thera ex 2:35 2:50   Therapeutic activity  2:50 3:10   eval  2:15 2:35          Approval Dates:  CPT Code Units Approved Units Used  Date Updated:                     Charges:  Timed Code Treatment Minutes: 35   Total Treatment Minutes: 60      [x] EVAL (LOW) 28622 (typically 20 minutes face-to-face)  [] EVAL (MOD) 04968 (typically 30 minutes face-to-face)  [] EVAL (HIGH) 01419 (typically 45 minutes face-to-face)  [] RE-EVAL     [x] LM(09979) x     [] Dry needle 1 or 2 Muscles (03819)  [] NMR (73745) x     [] Dry needle 3+ Muscles (90699)  [] Manual (65399) x     [] Ultrasound (04775) x  [x] TA (15732) x     [] Mech Traction (09934)  [] ES(attended) (74766)     [] ES (un) (12805):   [] Vasopump (17468) [] Ionto (96818)   [] Other:    Malvin Lau stated goal: \"regain left ankle/ foot strength and stability\"   []? Progressing: []? Met: []? Not Met: []? Adjusted     Therapist goals for Patient:   Short Term Goals: To be achieved in: 2 weeks  1. Independent in HEP and progression per patient tolerance, in order to prevent re-injury. []? Progressing: []? Met: []? Not Met: []? Adjusted        Long Term Goals: To be achieved in: 6-8 weeks  1. Patient will have a decrease in pain 0/10 to facilitate improvement in movement, function, and ADLs as indicated by Functional Deficits. []? Progressing: []? Met: []? Not Met: []? Adjusted  2. Patient will demonstrate increased AROM to wnl's to allow for proper joint functioning as indicated by patients Functional Deficits. []? Progressing: []? Met: []? Not Met: []? Adjusted  3. Patient will demonstrate an increase in Strength to 4+to 5/5 strength at left ankle foot and LLE  and control in left ankle/ foot  to allow for proper functional mobility as indicated by patients Functional Deficits. []? Progressing: []? Met: []? Not Met: []? Adjusted  4. Patient will return to full duty work and household functional activities without increased symptoms or restriction. []? Progressing: []? Met: []? Not Met: []?  Adjusted    ASSESSMENT:  See eval    Treatment/Activity Tolerance:  [x] Patient tolerated treatment well [] Patient limited by fatique  [] Patient limited by pain  [] Patient limited by other medical complications  [] Other:     Overall Progression Towards Functional goals/ Treatment Progress Update:  [] Patient is progressing as expected towards functional goals listed. [] Progression is slowed due to complexities/Impairments listed. [] Progression has been slowed due to co-morbidities. [x] Plan just implemented, too soon to assess goals progression <30days   [] Goals require adjustment due to lack of progress  [] Patient is not progressing as expected and requires additional follow up with physician  [] Other    Prognosis for POC: [x] Good [] Fair  [] Poor    Patient requires continued skilled intervention: [x] Yes  [] No        PLAN: Decrease swelling, promote healing/ stability at ankle / foot, ex. in mid rom avoiding pain, balance and proprioception  as tolerated   [] Continue per plan of care [] Alter current plan (see comments)  [x] Plan of care initiated [] Hold pending MD visit [] Discharge    Electronically signed by: Jocelyne Raines PT    Note: If patient does not return for scheduled/recommended follow up visits, this note will serve as a discharge from care along with the most recent update on progress.

## 2021-08-06 NOTE — PROGRESS NOTES
[facility]. [place] [address]  Phone: [phone]   Fax:     [phone]                                                       Physical Therapy Certification    Dear Referring Practitioner: Dr. Alexia Bai,    We had the pleasure of evaluating the following patient for physical therapy services at [facility]. A summary of our findings can be found in the initial assessment below. This includes our plan of care. If you have any questions or concerns regarding these findings, please do not hesitate to contact me at the office phone number checked above.   Thank you for the referral.       Physician Signature:_______________________________Date:__________________  By signing above (or electronic signature), therapists plan is approved by physician          Patient: Dee Reyes   : 1995   MRN: [id number]  Referring Physician: Referring Practitioner: Dr. Alexia Bai      Evaluation Date: 2021      Medical Diagnosis Information:  Diagnosis: Syndesmotic ankle sprain, left, initial encounter (J58.957A)   Treatment Diagnosis: Gait and mobility dysfunction s/p left ankle sprain                                         Insurance information: PT Insurance Information: [facility]- approved from 21-21 for 12 PT visits total, thru [facility], Reference number [id number]; was not approved by [facility]     Precautions/ Contra-indications: none  Latex Allergy:  [x]NO      []YES  Preferred Language for Healthcare:   [x]English       []other:    C-SSRS Triggered by Intake questionnaire (Past 2 wk assessment ):   [x] No, Questionnaire did not trigger screening.   [] Yes, Patient intake triggered C-SSRS Screening      [] C-SSRS Screening completed  [] PCP notified via Epic     SUBJECTIVE: Patient stated complaint: 2021 stepped in a hole while at work and experienced a popping sensation and sudden pain with swelling. Works as a   And is on light duty, activities include    Relevant Medical History:   Functional Outcome Measure: LEFS = 47    Pain Scale: 0-4/10  Easing factors:  Getting off of feet, ibuprofen, ice   Provocative factors: time on feet walking, standing     Type: []Constant   [x]Intermittent  []Radiating []Localized []other:     Numbness/Tingling: at the tips of his middle two toes but this has existed for 2 years following an impact injury to the left foot (stepped on a rock barefoot)    Occupation/School:      Living Status/Prior Level of Function: Independent with ADLs and IADLs    OBJECTIVE:     Posture: wfl's    Functional Mobility/Transfers: independent     Palpation: ttp at left ATFL and CFL    Bandages/Dressings/Incisions: n/a    Gait: walks wearing regular boots, no device used, gait is antalgic   Balance; SLS at RLE 10 seconds plus at LLE 5 seconds   ROM LEFT RIGHT   HIP Flex     HIP Abd     HIP Ext     HIP IR     HIP ER     Knee ext     Knee Flex     Ankle PF 0-62    Ankle DF 0-10    Ankle In 0-23    Ankle Ev 0-14    Strength  LEFT RIGHT   HIP Flexors     HIP Abductors     HIP Ext     Hip ER     Knee EXT (quad)     Knee Flex (HS)     Ankle DF 4+/5    Ankle PF 4+/5    Ankle Inv 4+/5    Ankle EV 4+/5         Circumference  Malleolus   7 cm prox     33.4 cm        Reflexes/Sensation:    [x]Dermatomes/Myotomes intact    [x]Reflexes equal and normal bilaterally   []Other:    Joint mobility: left ankle    []Normal    []Hypo   [x]Hyper    Orthopedic Special Tests:                        [x] Patient history, allergies, meds reviewed. Medical chart reviewed. See intake form. Review Of Systems (ROS):  [x]Performed Review of systems (Integumentary, CardioPulmonary, Neurological) by intake and observation. Intake form has been scanned into medical record.  Patient has been instructed to contact their primary care physician regarding ROS issues if not already being addressed at this time. Co-morbidities/Complexities (which will affect course of rehabilitation):   []None           Arthritic conditions   []Rheumatoid arthritis (M05.9)  []Osteoarthritis (M19.91)   Cardiovascular conditions   [x]Hypertension (I10)  []Hyperlipidemia (E78.5)  []Angina pectoris (I20)  []Atherosclerosis (I70)  []CVA Musculoskeletal conditions   []Disc pathology   []Congenital spine pathologies   []Prior surgical intervention  []Osteoporosis (M81.8)  []Osteopenia (M85.8)   Endocrine conditions   []Hypothyroid (E03.9)  []Hyperthyroid Gastrointestinal conditions   []Constipation (K61.09)   Metabolic conditions   []Morbid obesity (E66.01)  []Diabetes type 1(E10.65) or 2 (E11.65)   []Neuropathy (G60.9)     Pulmonary conditions   []Asthma (J45)  []Coughing   []COPD (J44.9)   Psychological Disorders  []Anxiety (F41.9)  []Depression (F32.9)   []Other:   [x]Other:     Left ankle fracture from age 14 , no surgery   Left shoulder fracture/ labrum surgery age 12        Barriers to/and or personal factors that will affect rehab potential:              []Age  []Sex    []Smoker              []Motivation/Lack of Motivation                        []Co-Morbidities              []Cognitive Function, education/learning barriers              [x]Environmental, home barriers              [x]profession/work barriers  []past PT/medical experience  []other:  Justification:     Falls Risk Assessment (30 days):   [x] Falls Risk assessed and no intervention required.   [] Falls Risk assessed and Patient requires intervention due to being higher risk   TUG score (>12s at risk):     [] Falls education provided, including         ASSESSMENT: Skilled PT services are required to address the following impairments   Functional Impairments:     []Noted lumbar/proximal hip/LE hypomobility   []Decreased LE functional ROM   []Decreased core/proximal hip strength and neuromuscular control   [x]Decreased LE functional strength   [x]Reduced balance/proprioceptive control   []other:      Functional Activity Limitations (from functional questionnaire and intake)   [x]Reduced ability to tolerate prolonged functional positions   []Reduced ability or difficulty with changes of positions or transfers between positions   []Reduced ability to maintain good posture and demonstrate good body mechanics with sitting, bending, and lifting   []Reduced ability to sleep   [] Reduced ability or tolerance with driving and/or computer work   [x]Reduced ability to perform lifting, carrying tasks   [x]Reduced ability to squat   []Reduced ability to forward bend   [x]Reduced ability to ambulate prolonged functional periods/distances/surfaces   [x]Reduced ability to ascend/descend stairs   [x]Reduced ability to run, hop or jump   []other:     Participation Restrictions   []Reduced participation in self care activities   [x]Reduced participation in home management activities   [x]Reduced participation in work activities   [x]Reduced participation in social activities. []Reduced participation in sport activities. Classification :    []Signs/symptoms consistent with post-surgical status including decreased ROM, strength and function.    []Signs/symptoms consistent with joint sprain/strain   []Signs/symptoms consistent with patella-femoral syndrome   []Signs/symptoms consistent with knee OA/hip OA   [x]Signs/symptoms consistent with internal derangement of ankle    []Signs/symptoms consistent with functional hip weakness/NMR control      []Signs/symptoms consistent with tendinitis/tendinosis    []signs/symptoms consistent with pathology which may benefit from Dry needling      []other:      Prognosis/Rehab Potential:      []Excellent   [x]Good    []Fair   []Poor    Tolerance of evaluation/treatment:    []Excellent   [x]Good    []Fair   []Poor    Physical Therapy Evaluation Complexity Justification  [x] A history of present problem with:  [] no personal factors and/or comorbidities that impact the plan of care;  [x]1-2 personal factors and/or comorbidities that impact the plan of care  []3 personal factors and/or comorbidities that impact the plan of care  [x] An examination of body systems using standardized tests and measures addressing any of the following: body structures and functions (impairments), activity limitations, and/or participation restrictions;:  [x] a total of 1-2 or more elements   [] a total of 3 or more elements   [] a total of 4 or more elements   [x] A clinical presentation with:  [x] stable and/or uncomplicated characteristics   [] evolving clinical presentation with changing characteristics  [] unstable and unpredictable characteristics;   [x] Clinical decision making of [x] low, [] moderate, [] high complexity using standardized patient assessment instrument and/or measurable assessment of functional outcome. [x] EVAL (LOW) 38141 (typically 20 minutes face-to-face)  [] EVAL (MOD) 64093 (typically 30 minutes face-to-face)  [] EVAL (HIGH) 46074 (typically 45 minutes face-to-face)  [] RE-EVAL     PLAN:  Frequency/Duration:  2 days per week for 6 Weeks:  Interventions:  [x]  Therapeutic exercise including: strength training, ROM, for Lower extremity and core   [x]  NMR activation and proprioception for LE, Glutes and Core   [x]  Manual therapy as indicated for LE, Hip and spine to include: Dry Needling/IASTM, STM, PROM, Gr I-IV mobilizations, manipulation. [x] Modalities as needed that may include: thermal agents, E-stim, Biofeedback, US, iontophoresis as indicated  [x] Patient education on joint protection, postural re-education, activity modification, progression of HEP. HEP instruction: Access Code: [email or web address]: [email or web address]: 08/06/2021Prepared by: Daljit BillingsleyExercises   Ankle Dorsiflexion with Resistance - 2 x daily - 7 x weekly - 1-2 sets - 30 reps - 3 hold   Ankle and Toe Plantarflexion with Resistance - 2 x daily - 7 x weekly - 1-2 sets - 30 reps - 3 hold   Ankle Eversion with Resistance - 2 x daily - 7 x weekly - 1-2 sets - 30 reps - 3 hold   Ankle Inversion with Resistance - 2 x daily - 7 x weekly - 1-2 sets - 30 reps - 3 hold  The patient demonstrated good tolerance to and understanding of the HEP. Written instructions have been issued. GOALS:  Patient stated goal: \"regain left ankle/ foot strength and stability\"   [] Progressing: [] Met: [] Not Met: [] Adjusted    Therapist goals for Patient:   Short Term Goals: To be achieved in: 2 weeks  1. Independent in HEP and progression per patient tolerance, in order to prevent re-injury. [] Progressing: [] Met: [] Not Met: [] Adjusted      Long Term Goals: To be achieved in: 6-8 weeks  1. Patient will have a decrease in pain 0/10 to facilitate improvement in movement, function, and ADLs as indicated by Functional Deficits. [] Progressing: [] Met: [] Not Met: [] Adjusted  2. Patient will demonstrate increased AROM to wnl's to allow for proper joint functioning as indicated by patients Functional Deficits. [] Progressing: [] Met: [] Not Met: [] Adjusted  3. Patient will demonstrate an increase in Strength to 4+to 5/5 strength at left ankle foot and LLE  and control in left ankle/ foot  to allow for proper functional mobility as indicated by patients Functional Deficits. [] Progressing: [] Met: [] Not Met: [] Adjusted  4. Patient will return to full duty work and household functional activities without increased symptoms or restriction. [] Progressing: [] Met: [] Not Met: [] Adjusted      Electronically signed by:  Halle Powell PT      Note: If patient does not return for scheduled/recommended follow up visits, this note will serve as a discharge from care along with the most recent update on progress.

## 2021-08-11 ENCOUNTER — TELEPHONE (OUTPATIENT)
Dept: ORTHOPEDIC SURGERY | Age: 26
End: 2021-08-11

## 2021-08-11 NOTE — TELEPHONE ENCOUNTER
I called patient and left message letting him know that his therapy was approved through [facility] and he has until 9/30/21 to complete his therapy.  I also gave him the Naren Maynard number to call since I thought this will be close to him

## 2021-08-12 ENCOUNTER — APPOINTMENT (OUTPATIENT)
Dept: PHYSICAL THERAPY | Age: 26
End: 2021-08-12
Payer: COMMERCIAL

## 2021-08-13 ENCOUNTER — APPOINTMENT (OUTPATIENT)
Dept: PHYSICAL THERAPY | Age: 26
End: 2021-08-13
Payer: COMMERCIAL

## 2021-09-08 ENCOUNTER — TELEPHONE (OUTPATIENT)
Dept: FAMILY MEDICINE CLINIC | Age: 26
End: 2021-09-08

## 2021-09-08 ENCOUNTER — NURSE TRIAGE (OUTPATIENT)
Dept: OTHER | Facility: CLINIC | Age: 26
End: 2021-09-08

## 2022-01-26 RX ORDER — VALSARTAN AND HYDROCHLOROTHIAZIDE 160; 12.5 MG/1; MG/1
TABLET, FILM COATED ORAL
Qty: 30 TABLET | Refills: 0 | Status: SHIPPED | OUTPATIENT
Start: 2022-01-26 | End: 2022-02-01 | Stop reason: DRUGHIGH

## 2022-01-26 RX ORDER — PANTOPRAZOLE SODIUM 40 MG/1
TABLET, DELAYED RELEASE ORAL
Qty: 30 TABLET | Refills: 0 | Status: SHIPPED | OUTPATIENT
Start: 2022-01-26 | End: 2022-02-21

## 2022-02-01 ENCOUNTER — OFFICE VISIT (OUTPATIENT)
Dept: FAMILY MEDICINE CLINIC | Age: 27
End: 2022-02-01
Payer: COMMERCIAL

## 2022-02-01 VITALS
DIASTOLIC BLOOD PRESSURE: 90 MMHG | SYSTOLIC BLOOD PRESSURE: 130 MMHG | HEART RATE: 88 BPM | WEIGHT: 315 LBS | BODY MASS INDEX: 42.66 KG/M2 | TEMPERATURE: 97 F | HEIGHT: 72 IN

## 2022-02-01 DIAGNOSIS — I10 HYPERTENSION, ESSENTIAL, BENIGN: Primary | ICD-10-CM

## 2022-02-01 DIAGNOSIS — R07.9 CHEST PAIN, UNSPECIFIED TYPE: ICD-10-CM

## 2022-02-01 DIAGNOSIS — G47.00 INSOMNIA, UNSPECIFIED TYPE: ICD-10-CM

## 2022-02-01 PROCEDURE — 99214 OFFICE O/P EST MOD 30 MIN: CPT | Performed by: FAMILY MEDICINE

## 2022-02-01 RX ORDER — VALSARTAN AND HYDROCHLOROTHIAZIDE 320; 12.5 MG/1; MG/1
1 TABLET, FILM COATED ORAL DAILY
Qty: 90 TABLET | Refills: 1 | Status: SHIPPED | OUTPATIENT
Start: 2022-02-01 | End: 2022-07-26

## 2022-02-01 RX ORDER — HYDROXYZINE PAMOATE 25 MG/1
25 CAPSULE ORAL NIGHTLY PRN
Qty: 30 CAPSULE | Refills: 0 | Status: SHIPPED | OUTPATIENT
Start: 2022-02-01 | End: 2022-02-24

## 2022-02-01 SDOH — ECONOMIC STABILITY: FOOD INSECURITY: WITHIN THE PAST 12 MONTHS, THE FOOD YOU BOUGHT JUST DIDN'T LAST AND YOU DIDN'T HAVE MONEY TO GET MORE.: NEVER TRUE

## 2022-02-01 SDOH — ECONOMIC STABILITY: FOOD INSECURITY: WITHIN THE PAST 12 MONTHS, YOU WORRIED THAT YOUR FOOD WOULD RUN OUT BEFORE YOU GOT MONEY TO BUY MORE.: NEVER TRUE

## 2022-02-01 ASSESSMENT — PATIENT HEALTH QUESTIONNAIRE - PHQ9
4. FEELING TIRED OR HAVING LITTLE ENERGY: 0
7. TROUBLE CONCENTRATING ON THINGS, SUCH AS READING THE NEWSPAPER OR WATCHING TELEVISION: 0
2. FEELING DOWN, DEPRESSED OR HOPELESS: 0
SUM OF ALL RESPONSES TO PHQ QUESTIONS 1-9: 0
SUM OF ALL RESPONSES TO PHQ QUESTIONS 1-9: 0
SUM OF ALL RESPONSES TO PHQ9 QUESTIONS 1 & 2: 0
8. MOVING OR SPEAKING SO SLOWLY THAT OTHER PEOPLE COULD HAVE NOTICED. OR THE OPPOSITE, BEING SO FIGETY OR RESTLESS THAT YOU HAVE BEEN MOVING AROUND A LOT MORE THAN USUAL: 0
9. THOUGHTS THAT YOU WOULD BE BETTER OFF DEAD, OR OF HURTING YOURSELF: 0
SUM OF ALL RESPONSES TO PHQ QUESTIONS 1-9: 0
5. POOR APPETITE OR OVEREATING: 0
6. FEELING BAD ABOUT YOURSELF - OR THAT YOU ARE A FAILURE OR HAVE LET YOURSELF OR YOUR FAMILY DOWN: 0
3. TROUBLE FALLING OR STAYING ASLEEP: 0
SUM OF ALL RESPONSES TO PHQ QUESTIONS 1-9: 0
1. LITTLE INTEREST OR PLEASURE IN DOING THINGS: 0

## 2022-02-01 ASSESSMENT — SOCIAL DETERMINANTS OF HEALTH (SDOH): HOW HARD IS IT FOR YOU TO PAY FOR THE VERY BASICS LIKE FOOD, HOUSING, MEDICAL CARE, AND HEATING?: NOT HARD AT ALL

## 2022-02-01 NOTE — PROGRESS NOTES
Subjective:      Patient ID: Brea Hawkins is a 31 y.o. male. Chief Complaint   Patient presents with    Follow-Up from [facility] \"hypertension\" 3-        Patient presents with:   Follow-Up from [facility]: [facility] \"hypertension\" 4-    Went to urgent care for cp  Ekg, cxr etc all ok  bp was up   Was given clonidine  And that helped   Not exertional     He drinks whiskey before bed  He uses this to sleep   Doing so for a year  He has drank more the last year    No fever  No ha of note   Chest feeling much better now  No sob  No abd pain  bm and urine is ok   No gerd no dysphagia   Stress and anxiety    He has had a pb getting used to the sleep machine    YOB: 1995    Date of Visit:  2/1/2022    No Known Allergies    Current Outpatient Medications:  valsartan-hydroCHLOROthiazide (DIOVAN-HCT) 160-12.5 MG per tablet, TAKE 1 TABLET BY MOUTH EVERY DAY, Disp: 30 tablet, Rfl: 0  pantoprazole (PROTONIX) 40 MG tablet, TAKE 1 TABLET BY MOUTH EVERY MORNING BEFORE BREAKFAST, Disp: 30 tablet, Rfl: 0  ibuprofen (ADVIL;MOTRIN) 800 MG tablet, Take 1 tablet by mouth 2 times daily (with meals), Disp: 28 tablet, Rfl: 0  melatonin 5 MG TABS tablet, Take 5 mg by mouth daily, Disp: , Rfl:     No current facility-administered medications for this visit.      ----------------------------------------------                 02/01/22 02/01/22                      1046              1047       ----------------------------------------------   BP:          (!) 130/90        (!) 130/90     Site:      Left Upper Arm    Left Upper Arm   Position:       Sitting          Sitting       Cuff Size:     Large Adult      Large Adult     Pulse:                             88         Temp:      97 °F (36.1 °C)                    TempSrc:      Temporal                        Weight: (!) 325 lb (147.4 kg)                 Height:     6' (1.829 m) ----------------------------------------------  Body mass index is 44.08 kg/m². Wt Readings from Last 3 Encounters:  02/01/22 : (!) 325 lb (147.4 kg)  07/29/21 : (!) 310 lb (140.6 kg)  07/15/21 : (!) 310 lb (140.6 kg)    BP Readings from Last 3 Encounters:  02/01/22 : (!) 130/90  07/15/21 : (!) 149/95  07/13/21 : (!) 159/112              Review of Systems    Objective:   Physical Exam  Constitutional:       General: He is not in acute distress. Appearance: Normal appearance. He is well-developed. He is not ill-appearing or diaphoretic. HENT:      Head: Normocephalic and atraumatic. Cardiovascular:      Rate and Rhythm: Normal rate and regular rhythm. Heart sounds: Normal heart sounds. No murmur heard. No friction rub. No gallop. Pulmonary:      Effort: Pulmonary effort is normal. No tachypnea, accessory muscle usage or respiratory distress. Breath sounds: Normal breath sounds. No decreased breath sounds, wheezing, rhonchi or rales. Chest:   Breasts:      Right: No supraclavicular adenopathy. Left: No supraclavicular adenopathy. Lymphadenopathy:      Cervical: No cervical adenopathy. Upper Body:      Right upper body: No supraclavicular adenopathy. Left upper body: No supraclavicular adenopathy. Skin:     General: Skin is warm and dry. Coloration: Skin is not pale. Neurological:      Mental Status: He is alert. Assessment:        Diagnosis Orders   1. Hypertension, essential, benign     2.  Insomnia, unspecified type       Orders Placed This Encounter   Medications    valsartan-hydroCHLOROthiazide (DIOVAN-HCT) 320-12.5 MG per tablet     Sig: Take 1 tablet by mouth daily     Dispense:  90 tablet     Refill:  1    hydrOXYzine (VISTARIL) 25 MG capsule     Sig: Take 1 capsule by mouth nightly as needed (sleep)     Dispense:  30 capsule     Refill:  0       Improved  He has not returned as we asked   Discussed medicine and adjustments to be made and additional     Reviewed note on line form yesterday   He had high glucose liver testing other cbc and cmp ok  He had nml cxr reviewed   ekg is ok       Plan:      I am changing the medicine for blood pressure to a higher dose  I cathleen like to have you see the mental health person here in the office  Use the medicine for sleep at night\  See cardiology for a check up   See me in 4 weeks        Janelle Fernandez MD

## 2022-02-01 NOTE — PATIENT INSTRUCTIONS
I am changing the medicine for blood pressure to a higher dose  I cathleen like to have you see the mental health person here in the office  Use the medicine for sleep at night\  See cardiology for a check up   See me in 4 weeks

## 2022-02-08 NOTE — PROGRESS NOTES
[facility], [place]      Cardiology Consult    Juana Klein  1995 February 9, 2022    Referring Physician: Dawn Horne MD  Reason for Referral: Chest pain, hypertension      CC: \"I had some chest pains and my blood pressure was high. \"     HPI:  The patient is 31 y.o. male with a past medical history significant for obesity, hypertension and sleep apnea who presents today for evaluation of chest pains. He was diagnosed with HTN several years ago. He states his blood pressure has been elevated for the past 1-2 months along with recurrent chest pains. He describes the pains as a \"tightness. \" He states he sought treatment at urgent care in [place] and his work up was unremarkable. He was seen by his primary care physician and his medications were adjusted but was referred to cardiology for further evaluation. He denies any recurrent chest pains but reports an occasional focal chest pain that he states is a \"sharp\" sensation and lasts a second. He reports chronic back pain and questions if the chest pains were related to his chronic back issues. He states he was diagnosed with sleep apnea but noncompliant with his CPAP due to equipment issues. He denies worsening shortness of breath. He reports compliance with his medications and tolerating. Patient denies exertional chest pain/pressure, dyspnea at rest, worsening ARRDEONDO, PND, orthopnea, palpitations, lightheadedness, weight changes, changes in LE edema, and syncope.     Past Medical History:   Diagnosis Date    Bipolar affective disorder ([facility] [address]     GERD (gastroesophageal reflux disease)     Hypertension     PURVI (obstructive sleep apnea)      Past Surgical History:   Procedure Laterality Date    LIPOMA RESECTION      SHOULDER SURGERY      left     Family History   Problem Relation Age of Onset    Asthma Sister     Cancer Paternal Grandmother         colon    Diabetes Mother     High Blood Pressure Mother     High Blood Pressure Father     Cancer Maternal Grandfather      Social History     Tobacco Use    Smoking status: Never Smoker    Smokeless tobacco: Current User     Types: Chew    Tobacco comment: 1/26/21 Trying to quit, a third of a can a day    Vaping Use    Vaping Use: Never used   Substance Use Topics    Alcohol use: Yes     Comment: every other day    Drug use: No       No Known Allergies  Current Outpatient Medications   Medication Sig Dispense Refill    valsartan-hydroCHLOROthiazide (DIOVAN-HCT) 320-12.5 MG per tablet Take 1 tablet by mouth daily 90 tablet 1    hydrOXYzine (VISTARIL) 25 MG capsule Take 1 capsule by mouth nightly as needed (sleep) 30 capsule 0    pantoprazole (PROTONIX) 40 MG tablet TAKE 1 TABLET BY MOUTH EVERY MORNING BEFORE BREAKFAST 30 tablet 0    ibuprofen (ADVIL;MOTRIN) 800 MG tablet Take 1 tablet by mouth 2 times daily (with meals) 28 tablet 0     No current facility-administered medications for this visit. Review of Systems:  · Constitutional: No unanticipated weight loss. There's been no change in energy level, sleep pattern, or activity level. No fevers, chills. · Eyes: No visual changes or diplopia. No scleral icterus. · ENT: No Headaches, hearing loss or vertigo. No mouth sores or sore throat. · Cardiovascular: as reviewed in HPI  · Respiratory: No cough or wheezing, no sputum production. No hemoptysis. · Gastrointestinal: No abdominal pain, appetite loss, blood in stools. No change in bowel or bladder habits. · Genitourinary: No dysuria, trouble voiding, or hematuria. · Musculoskeletal:  No gait disturbance, no joint complaints. · Integumentary: No rash or pruritis. · Neurological: No headache, diplopia, change in muscle strength, numbness or tingling. · Psychiatric: No anxiety or depression. · Endocrine: No temperature intolerance. No excessive thirst, fluid intake, or urination. No tremor.   · Hematologic/Lymphatic: No abnormal bruising or bleeding, blood clots or swollen lymph nodes.  · Allergic/Immunologic: No nasal congestion or hives. Physical Exam:   /82   Pulse 103   Ht 6' (1.829 m)   Wt (!) 328 lb (148.8 kg)   SpO2 96%   BMI 44.48 kg/m²   Wt Readings from Last 3 Encounters:   02/09/22 (!) 328 lb (148.8 kg)   02/01/22 (!) 325 lb (147.4 kg)   07/29/21 (!) 310 lb (140.6 kg)     Constitutional: He is oriented to person, place, and time. He appears well-developed and well-nourished. In no acute distress. Head: Normocephalic and atraumatic. Pupils equal and round. Neck: Neck supple. No JVP or carotid bruit appreciated. No mass and no thyromegaly present. No lymphadenopathy present. Cardiovascular: Normal rate. Normal heart sounds. Exam reveals no gallop and no friction rub. No murmur heard. Pulmonary/Chest: Effort normal and breath sounds normal. No respiratory distress. He has no wheezes, rhonchi or rales. Abdominal: Soft, non-tender. Bowel sounds are normal. He exhibits no organomegaly, mass or bruit. Extremities: No edema. No cyanosis or clubbing. Pulses are 2+ radial/carotid bilaterally. Neurological: No gross cranial nerve deficit. Coordination normal.   Skin: Skin is warm and dry. There is no rash or diaphoresis. Psychiatric: He has a normal mood and affect. His speech is normal and behavior is normal.     Lab Review:   FLP:  No results found for: TRIG, HDL, LDLCALC, LDLDIRECT, LABVLDL  BUN/Creatinine:    Lab Results   Component Value Date    BUN 10 01/18/2021    CREATININE 0.8 01/18/2021     EKG Interpretation: 2/8/22 Sinus tachycardia qR pattern in V1 suggest RV conduction delay. Image Review:   No echo or stress on file. Assessment/Plan:   1) Atypical chest pain. Likely noncardiac based on description. Denies any exertional chest pains. No plans for stress testing at this time as he is likely \"low risk\" for CAD. Advised to call if he has exertional chest pain. Will obtain a lipid panel. 2) Essential hypertension. Goal BP <130/80.  Continue medical therapy. Will arrange for  lab work to help rule out secondary hypertension. Obesity and untreated PURVI may be contributing. 3) PURVI. Reports noncompliance with CPAP due to equipment issues. Encouraged compliance. Discussed a sleep referral but patient prefers to follow up with medical supply company for other equipment options. 4) Morbid obesity. BMI 44.4. Encouraged weight loss and increase activity as tolerated. Follow up as needed and will call lab results     Thank you very much for allowing me to participate in the care of your patient. Please do not hesitate to contact me if you have any questions. Sincerely,  Oscar Craven. Henna Elder, [address] [place] [address], [address] [place] [address]  Ph: [phone]  Fax: [phone]    This note was scribed in the presence of Dr Henna Elder MD by Greta Cm RN. Physician Attestation: The scribes documentation has been prepared under my direction and personally reviewed by me in its entirety. I confirm that the note above accurately reflects all work, treatment, procedures, and medical decision making performed by me. All portions of the note including but not limited to the chief complaint, history of present illness, physical exam, assessment and plan/medical decision making were personally reviewed, edited, and updated on the day of the visit.

## 2022-02-09 ENCOUNTER — OFFICE VISIT (OUTPATIENT)
Dept: CARDIOLOGY CLINIC | Age: 27
End: 2022-02-09
Payer: COMMERCIAL

## 2022-02-09 VITALS
DIASTOLIC BLOOD PRESSURE: 82 MMHG | WEIGHT: 315 LBS | HEIGHT: 72 IN | BODY MASS INDEX: 42.66 KG/M2 | SYSTOLIC BLOOD PRESSURE: 136 MMHG | HEART RATE: 103 BPM | OXYGEN SATURATION: 96 %

## 2022-02-09 DIAGNOSIS — G47.33 OSA (OBSTRUCTIVE SLEEP APNEA): ICD-10-CM

## 2022-02-09 DIAGNOSIS — E66.01 MORBID OBESITY (HCC): ICD-10-CM

## 2022-02-09 DIAGNOSIS — I10 ESSENTIAL HYPERTENSION: ICD-10-CM

## 2022-02-09 DIAGNOSIS — R07.89 ATYPICAL CHEST PAIN: Primary | ICD-10-CM

## 2022-02-09 PROCEDURE — 99204 OFFICE O/P NEW MOD 45 MIN: CPT | Performed by: INTERNAL MEDICINE

## 2022-02-09 PROCEDURE — 93000 ELECTROCARDIOGRAM COMPLETE: CPT | Performed by: INTERNAL MEDICINE

## 2022-02-11 DIAGNOSIS — I10 ESSENTIAL HYPERTENSION: ICD-10-CM

## 2022-02-11 DIAGNOSIS — E66.01 MORBID OBESITY (HCC): ICD-10-CM

## 2022-02-11 DIAGNOSIS — R07.89 ATYPICAL CHEST PAIN: ICD-10-CM

## 2022-02-11 LAB
A/G RATIO: 1.6 (ref 1.1–2.2)
ALBUMIN SERPL-MCNC: 4.5 G/DL (ref 3.4–5)
ALP BLD-CCNC: 75 U/L (ref 40–129)
ALT SERPL-CCNC: 69 U/L (ref 10–40)
ANION GAP SERPL CALCULATED.3IONS-SCNC: 17 MMOL/L (ref 3–16)
AST SERPL-CCNC: 51 U/L (ref 15–37)
BILIRUB SERPL-MCNC: 0.4 MG/DL (ref 0–1)
BUN BLDV-MCNC: 10 MG/DL (ref 7–20)
CALCIUM SERPL-MCNC: 9.5 MG/DL (ref 8.3–10.6)
CHLORIDE BLD-SCNC: 97 MMOL/L (ref 99–110)
CHOLESTEROL, FASTING: 201 MG/DL (ref 0–199)
CO2: 22 MMOL/L (ref 21–32)
CREAT SERPL-MCNC: 0.5 MG/DL (ref 0.9–1.3)
GFR AFRICAN AMERICAN: >60
GFR NON-AFRICAN AMERICAN: >60
GLUCOSE BLD-MCNC: 122 MG/DL (ref 70–99)
HCT VFR BLD CALC: 59.6 % (ref 40.5–52.5)
HDLC SERPL-MCNC: 55 MG/DL (ref 40–60)
HEMOGLOBIN: 20 G/DL (ref 13.5–17.5)
LDL CHOLESTEROL CALCULATED: 114 MG/DL
MCH RBC QN AUTO: 29.3 PG (ref 26–34)
MCHC RBC AUTO-ENTMCNC: 33.6 G/DL (ref 31–36)
MCV RBC AUTO: 87.2 FL (ref 80–100)
PDW BLD-RTO: 13.3 % (ref 12.4–15.4)
PLATELET # BLD: 178 K/UL (ref 135–450)
PLATELET SLIDE REVIEW: ADEQUATE
PMV BLD AUTO: 9.4 FL (ref 5–10.5)
POTASSIUM SERPL-SCNC: 4.5 MMOL/L (ref 3.5–5.1)
RBC # BLD: 6.83 M/UL (ref 4.2–5.9)
SLIDE REVIEW: ABNORMAL
SODIUM BLD-SCNC: 136 MMOL/L (ref 136–145)
TOTAL PROTEIN: 7.3 G/DL (ref 6.4–8.2)
TRIGLYCERIDE, FASTING: 162 MG/DL (ref 0–150)
TSH REFLEX: 1.37 UIU/ML (ref 0.27–4.2)
VLDLC SERPL CALC-MCNC: 32 MG/DL
WBC # BLD: 5.5 K/UL (ref 4–11)

## 2022-02-17 LAB
ALDOSTERONE/RENIN ACTIVITY CALCULATION: 0.4 RATIO
ALDOSTERONE: 8.1 NG/DL
METANEPH/PLASMA INTERP: NORMAL
METANEPHRINE FREE PLASMA: <0.1 NMOL/L (ref 0–0.49)
NORMETANEPHRINE FREE PLASMA: 0.41 NMOL/L (ref 0–0.89)
RENIN ACTIVITY: 19.4 NG/ML/HR
RENIN ACTIVITY: 27.4 NG/ML/HR

## 2022-02-18 ENCOUNTER — TELEPHONE (OUTPATIENT)
Dept: CARDIOLOGY CLINIC | Age: 27
End: 2022-02-18

## 2022-02-18 NOTE — TELEPHONE ENCOUNTER
Called [facility] advised Dr Leesa Salcido would be back Monday and he would get a call then. Pt understood.     [facility] # [phone]

## 2022-02-18 NOTE — TELEPHONE ENCOUNTER
Dr. Ciaran Vasquez will review results and make recommendations upon his return to office on Monday. Libra Salinas at the  called patient to inform him we will return the call Monday.

## 2022-02-18 NOTE — TELEPHONE ENCOUNTER
Julien Richard called in this afternoon concerned about his lab results. He asked if Dr. Henna Elder or his RN can call him back to go over the results.      You can reach Julien Richard at #[phone]

## 2022-02-21 RX ORDER — PANTOPRAZOLE SODIUM 40 MG/1
TABLET, DELAYED RELEASE ORAL
Qty: 30 TABLET | Refills: 2 | Status: SHIPPED | OUTPATIENT
Start: 2022-02-21 | End: 2022-05-19

## 2022-02-21 NOTE — TELEPHONE ENCOUNTER
Dr. Ana Paula Gamez     Patient seen in the office 2/9/22 regarding atypical chest pain, HTN, PURVI. Completed labs 2/11/22. Please review and advise.

## 2022-02-24 RX ORDER — HYDROXYZINE PAMOATE 25 MG/1
25 CAPSULE ORAL NIGHTLY PRN
Qty: 30 CAPSULE | Refills: 0 | Status: SHIPPED | OUTPATIENT
Start: 2022-02-24 | End: 2022-03-16 | Stop reason: SDUPTHER

## 2022-03-17 RX ORDER — HYDROXYZINE PAMOATE 25 MG/1
25 CAPSULE ORAL NIGHTLY PRN
Qty: 90 CAPSULE | Refills: 0 | Status: SHIPPED | OUTPATIENT
Start: 2022-03-17 | End: 2022-06-17

## 2022-05-16 RX ORDER — ALBUTEROL SULFATE 90 UG/1
AEROSOL, METERED RESPIRATORY (INHALATION)
Qty: 8.5 EACH | Refills: 1 | Status: SHIPPED | OUTPATIENT
Start: 2022-05-16

## 2022-05-19 RX ORDER — PANTOPRAZOLE SODIUM 40 MG/1
TABLET, DELAYED RELEASE ORAL
Qty: 90 TABLET | Refills: 1 | Status: SHIPPED | OUTPATIENT
Start: 2022-05-19

## 2022-06-17 RX ORDER — HYDROXYZINE PAMOATE 25 MG/1
25 CAPSULE ORAL NIGHTLY PRN
Qty: 90 CAPSULE | Refills: 0 | Status: SHIPPED | OUTPATIENT
Start: 2022-06-17

## 2022-06-26 NOTE — TELEPHONE ENCOUNTER
Gio Lechuga called in this afternoon wanting to know if Dr. Mukul Burt had a chance to look at his lab results?        You can reach Gio Lechuga at #[phone] English

## 2022-07-26 RX ORDER — VALSARTAN AND HYDROCHLOROTHIAZIDE 320; 12.5 MG/1; MG/1
TABLET, FILM COATED ORAL
Qty: 90 TABLET | Refills: 0 | Status: SHIPPED | OUTPATIENT
Start: 2022-07-26 | End: 2022-10-24

## 2022-10-24 RX ORDER — VALSARTAN AND HYDROCHLOROTHIAZIDE 320; 12.5 MG/1; MG/1
TABLET, FILM COATED ORAL
Qty: 30 TABLET | Refills: 0 | Status: SHIPPED | OUTPATIENT
Start: 2022-10-24 | End: 2022-11-17

## 2022-11-17 RX ORDER — VALSARTAN AND HYDROCHLOROTHIAZIDE 320; 12.5 MG/1; MG/1
TABLET, FILM COATED ORAL
Qty: 30 TABLET | Refills: 0 | Status: SHIPPED | OUTPATIENT
Start: 2022-11-17

## 2022-12-08 RX ORDER — VALSARTAN AND HYDROCHLOROTHIAZIDE 320; 12.5 MG/1; MG/1
TABLET, FILM COATED ORAL
Qty: 30 TABLET | Refills: 0 | OUTPATIENT
Start: 2022-12-08

## 2022-12-08 RX ORDER — VALSARTAN AND HYDROCHLOROTHIAZIDE 320; 12.5 MG/1; MG/1
1 TABLET, FILM COATED ORAL DAILY
Qty: 15 TABLET | Refills: 0 | Status: SHIPPED | OUTPATIENT
Start: 2022-12-08

## 2022-12-20 RX ORDER — VALSARTAN AND HYDROCHLOROTHIAZIDE 320; 12.5 MG/1; MG/1
TABLET, FILM COATED ORAL
Qty: 15 TABLET | Refills: 0 | Status: SHIPPED | OUTPATIENT
Start: 2022-12-20